# Patient Record
Sex: FEMALE | Race: WHITE | NOT HISPANIC OR LATINO
[De-identification: names, ages, dates, MRNs, and addresses within clinical notes are randomized per-mention and may not be internally consistent; named-entity substitution may affect disease eponyms.]

---

## 2017-01-24 PROCEDURE — 88305 TISSUE EXAM BY PATHOLOGIST: CPT | Performed by: RADIOLOGY

## 2017-03-14 ENCOUNTER — CHARTING TRANS (OUTPATIENT)
Dept: OTHER | Age: 42
End: 2017-03-14

## 2017-03-21 ENCOUNTER — CHARTING TRANS (OUTPATIENT)
Dept: OTHER | Age: 42
End: 2017-03-21

## 2017-07-17 PROCEDURE — 88305 TISSUE EXAM BY PATHOLOGIST: CPT | Performed by: OBSTETRICS & GYNECOLOGY

## 2017-09-13 PROCEDURE — 88305 TISSUE EXAM BY PATHOLOGIST: CPT | Performed by: RADIOLOGY

## 2017-09-22 ENCOUNTER — CHARTING TRANS (OUTPATIENT)
Dept: OTHER | Age: 42
End: 2017-09-22

## 2017-10-04 ENCOUNTER — CHARTING TRANS (OUTPATIENT)
Dept: OTHER | Age: 42
End: 2017-10-04

## 2018-01-12 PROBLEM — F32.81 PMDD (PREMENSTRUAL DYSPHORIC DISORDER): Status: ACTIVE | Noted: 2018-01-12

## 2018-08-05 ENCOUNTER — HOSPITAL ENCOUNTER (OUTPATIENT)
Age: 43
Discharge: HOME | End: 2018-08-05
Attending: EMERGENCY MEDICINE
Payer: COMMERCIAL

## 2018-08-05 VITALS
DIASTOLIC BLOOD PRESSURE: 103 MMHG | HEART RATE: 80 BPM | RESPIRATION RATE: 16 BRPM | TEMPERATURE: 98 F | SYSTOLIC BLOOD PRESSURE: 146 MMHG | WEIGHT: 215 LBS | OXYGEN SATURATION: 98 % | BODY MASS INDEX: 34 KG/M2

## 2018-08-05 DIAGNOSIS — J40 BRONCHITIS: ICD-10-CM

## 2018-08-05 DIAGNOSIS — H66.001 ACUTE SUPPURATIVE OTITIS MEDIA OF RIGHT EAR WITHOUT SPONTANEOUS RUPTURE OF TYMPANIC MEMBRANE, RECURRENCE NOT SPECIFIED: Primary | ICD-10-CM

## 2018-08-05 PROCEDURE — 99204 OFFICE O/P NEW MOD 45 MIN: CPT

## 2018-08-05 PROCEDURE — 99213 OFFICE O/P EST LOW 20 MIN: CPT

## 2018-08-05 RX ORDER — BENZONATATE 200 MG/1
200 CAPSULE ORAL 3 TIMES DAILY PRN
Qty: 30 CAPSULE | Refills: 0 | Status: SHIPPED | OUTPATIENT
Start: 2018-08-05 | End: 2018-09-04

## 2018-08-05 RX ORDER — ALBUTEROL SULFATE 90 UG/1
2 AEROSOL, METERED RESPIRATORY (INHALATION) EVERY 4 HOURS PRN
Qty: 1 INHALER | Refills: 0 | Status: SHIPPED | OUTPATIENT
Start: 2018-08-05 | End: 2018-09-04

## 2018-08-05 RX ORDER — METHYLPREDNISOLONE 4 MG/1
TABLET ORAL
Qty: 1 PACKAGE | Refills: 0 | Status: SHIPPED | OUTPATIENT
Start: 2018-08-05 | End: 2018-11-01 | Stop reason: ALTCHOICE

## 2018-08-05 RX ORDER — AMOXICILLIN AND CLAVULANATE POTASSIUM 875; 125 MG/1; MG/1
1 TABLET, FILM COATED ORAL 2 TIMES DAILY
Qty: 20 TABLET | Refills: 0 | Status: SHIPPED | OUTPATIENT
Start: 2018-08-05 | End: 2018-08-15

## 2018-08-05 NOTE — ED PROVIDER NOTES
Patient presents with:  Cough/URI  Fever (infectious)    HPI:     Jerry Joe is a 38year old female who presents with chief complaint of cough, congestion, ear pressure.   Started about 7-8 days ago with mild congestion which she attributed to [employer] bacterial.  Given ear findings will rx augmentin. Will add medrol dose pack, albuterol and tessalon to aid with cough. F/u with PCP in 3-5 days as needed, sooner, if symptoms worsen. Pt chose to forgo CXR.       Assessment/Plan:     Diagnosis:  Otitis me

## 2018-11-01 ENCOUNTER — OFFICE VISIT (OUTPATIENT)
Dept: FAMILY MEDICINE CLINIC | Facility: CLINIC | Age: 43
End: 2018-11-01
Payer: COMMERCIAL

## 2018-11-01 VITALS
BODY MASS INDEX: 33.74 KG/M2 | HEART RATE: 75 BPM | OXYGEN SATURATION: 98 % | TEMPERATURE: 99 F | SYSTOLIC BLOOD PRESSURE: 128 MMHG | HEIGHT: 67 IN | DIASTOLIC BLOOD PRESSURE: 82 MMHG | WEIGHT: 215 LBS | RESPIRATION RATE: 16 BRPM

## 2018-11-01 DIAGNOSIS — J01.01 ACUTE RECURRENT MAXILLARY SINUSITIS: Primary | ICD-10-CM

## 2018-11-01 DIAGNOSIS — H69.83 EUSTACHIAN TUBE DYSFUNCTION, BILATERAL: ICD-10-CM

## 2018-11-01 PROCEDURE — 99202 OFFICE O/P NEW SF 15 MIN: CPT | Performed by: NURSE PRACTITIONER

## 2018-11-01 RX ORDER — METHYLPREDNISOLONE 4 MG/1
TABLET ORAL
Qty: 1 KIT | Refills: 0 | Status: SHIPPED | OUTPATIENT
Start: 2018-11-01 | End: 2018-11-16 | Stop reason: ALTCHOICE

## 2018-11-01 RX ORDER — AMOXICILLIN AND CLAVULANATE POTASSIUM 875; 125 MG/1; MG/1
TABLET, FILM COATED ORAL
Qty: 20 TABLET | Refills: 0 | Status: SHIPPED | OUTPATIENT
Start: 2018-11-01 | End: 2018-11-16 | Stop reason: ALTCHOICE

## 2018-11-01 NOTE — PROGRESS NOTES
CHIEF COMPLAINT:   Patient presents with:  Sinus Problem: congestion,sinus pressure,earache sx x 2 months. HPI:   Nikko You is a 38year old female who presents for sinus symptoms for  2 months.   Reports she has allergies and they've essentiall Tobacco Use      Smoking status: Never Smoker      Smokeless tobacco: Never Used    Alcohol use:  Yes      Alcohol/week: 0.0 oz      Comment: 2-3 times a week    Drug use: No        REVIEW OF SYSTEMS:   GENERAL: feels well otherwise, normal appetite  SKIN - Advised F/U visit if no improvement/worsening within 5-7 days; sooner if new fever or worsening breathing. To ER if ever dyspnea, chest pain, SOB, severe HA/neuro change. - Pt verbalizes understanding and is agreeable w/ plan.     Meds & Refills for Deysi Mayo · A polyp is a sac of swollen tissue. It can be the result of an allergy or infection. It may block the opening where most of your sinuses drain (middle meatus). It may even grow large enough to block your nose.   · A deviated septum is when the thin wall i · Heat may help soothe painful areas of your face. Use a towel soaked in hot water. Or,  the shower and direct the warm spray onto your face.  Using a vaporizer along with a menthol rub at night may also help soothe symptoms.   · An expectorant with · Swelling of your forehead or eyelids  · Vision problems, such as blurred or double vision  · Fever of 100.4ºF (38ºC) or higher, or as directed by your healthcare provider  · Seizure  · Breathing problems  · Symptoms don't go away in 10 days  Prevention

## 2018-11-01 NOTE — PATIENT INSTRUCTIONS
Causes of Sinusitis  Mucus helps keep your sinuses clean. But mucus may build up in the sinuses because of colds, allergies, or blockages. These things get in the way of the natural drainage of mucus. This may lead to sinusitis.  Sinusitis means sinus inf © 3834-2746 [employer] [address]. [facility], [facility] [place]. All rights reserved. This information is not intended as a substitute for professional medical care. Always follow your healthcare professional's instructions.         Sinusit · You can use an over-the-counter decongestant, unless a similar medicine was prescribed to you. Nasal sprays work the fastest. Use one that contains phenylephrine or oxymetazoline. First blow your nose gently. Then use the spray.  Do not use these medicine · Don’t have close contact with people who have sore throats, colds, or other upper respiratory infections. · Don’t smoke, and stay away from secondhand smoke. · Stay up to date with of your vaccines.   Date Last Reviewed: 11/1/2017  © 9146-5992 [employer]

## 2018-11-16 PROCEDURE — 82784 ASSAY IGA/IGD/IGG/IGM EACH: CPT | Performed by: ALLERGY & IMMUNOLOGY

## 2018-11-16 PROCEDURE — 86774 TETANUS ANTIBODY: CPT | Performed by: ALLERGY & IMMUNOLOGY

## 2018-11-16 PROCEDURE — 86648 DIPHTHERIA ANTIBODY: CPT | Performed by: ALLERGY & IMMUNOLOGY

## 2018-11-16 PROCEDURE — 86317 IMMUNOASSAY INFECTIOUS AGENT: CPT | Performed by: ALLERGY & IMMUNOLOGY

## 2018-11-29 PROBLEM — J32.0 CHRONIC MAXILLARY SINUSITIS: Status: ACTIVE | Noted: 2018-11-29

## 2018-11-29 PROBLEM — G44.221 CHRONIC TENSION-TYPE HEADACHE, INTRACTABLE: Status: ACTIVE | Noted: 2018-11-29

## 2019-07-21 ENCOUNTER — WALK IN (OUTPATIENT)
Dept: URGENT CARE | Age: 44
End: 2019-07-21

## 2019-07-21 VITALS
TEMPERATURE: 99 F | HEIGHT: 67 IN | OXYGEN SATURATION: 99 % | HEART RATE: 74 BPM | SYSTOLIC BLOOD PRESSURE: 144 MMHG | BODY MASS INDEX: 31.08 KG/M2 | DIASTOLIC BLOOD PRESSURE: 96 MMHG | WEIGHT: 198 LBS | RESPIRATION RATE: 16 BRPM

## 2019-07-21 DIAGNOSIS — J06.9 ACUTE URI: Primary | ICD-10-CM

## 2019-07-21 PROCEDURE — 99203 OFFICE O/P NEW LOW 30 MIN: CPT | Performed by: NURSE PRACTITIONER

## 2019-07-21 RX ORDER — LEVONORGESTREL AND ETHINYL ESTRADIOL 100-20(84)
1 KIT ORAL
COMMUNITY
Start: 2019-02-07 | End: 2020-02-07

## 2019-07-21 RX ORDER — FLUOXETINE HYDROCHLORIDE 20 MG/1
1 CAPSULE ORAL
COMMUNITY
Start: 2016-02-23 | End: 2020-05-17

## 2019-07-21 RX ORDER — BENZONATATE 200 MG/1
200 CAPSULE ORAL 3 TIMES DAILY PRN
Qty: 30 CAPSULE | Refills: 0 | Status: SHIPPED | OUTPATIENT
Start: 2019-07-21

## 2019-07-21 ASSESSMENT — ENCOUNTER SYMPTOMS
COUGH: 1
FATIGUE: 1
SORE THROAT: 0
SINUS PAIN: 0
WHEEZING: 0
FEVER: 0
DIAPHORESIS: 0
SHORTNESS OF BREATH: 0
CHILLS: 0

## 2019-07-24 ENCOUNTER — MED REC SCAN ONLY (OUTPATIENT)
Dept: FAMILY MEDICINE CLINIC | Facility: CLINIC | Age: 44
End: 2019-07-24

## 2019-07-24 ENCOUNTER — OFFICE VISIT (OUTPATIENT)
Dept: FAMILY MEDICINE CLINIC | Facility: CLINIC | Age: 44
End: 2019-07-24
Payer: COMMERCIAL

## 2019-07-24 VITALS
WEIGHT: 200 LBS | HEART RATE: 88 BPM | SYSTOLIC BLOOD PRESSURE: 144 MMHG | BODY MASS INDEX: 31.39 KG/M2 | TEMPERATURE: 99 F | HEIGHT: 67 IN | DIASTOLIC BLOOD PRESSURE: 92 MMHG | RESPIRATION RATE: 20 BRPM

## 2019-07-24 DIAGNOSIS — I10 ESSENTIAL HYPERTENSION: Primary | ICD-10-CM

## 2019-07-24 DIAGNOSIS — Z00.00 LABORATORY EXAMINATION ORDERED AS PART OF A ROUTINE GENERAL MEDICAL EXAMINATION: ICD-10-CM

## 2019-07-24 DIAGNOSIS — J06.9 UPPER RESPIRATORY TRACT INFECTION, UNSPECIFIED TYPE: ICD-10-CM

## 2019-07-24 DIAGNOSIS — R07.89 CHEST DISCOMFORT: ICD-10-CM

## 2019-07-24 DIAGNOSIS — E78.00 ELEVATED LDL CHOLESTEROL LEVEL: ICD-10-CM

## 2019-07-24 PROCEDURE — 99204 OFFICE O/P NEW MOD 45 MIN: CPT | Performed by: NURSE PRACTITIONER

## 2019-07-24 PROCEDURE — 93000 ELECTROCARDIOGRAM COMPLETE: CPT | Performed by: NURSE PRACTITIONER

## 2019-07-24 RX ORDER — AZITHROMYCIN 250 MG/1
TABLET, FILM COATED ORAL
Qty: 6 TABLET | Refills: 0 | Status: SHIPPED | OUTPATIENT
Start: 2019-07-24 | End: 2019-10-18 | Stop reason: ALTCHOICE

## 2019-07-24 RX ORDER — BENZONATATE 100 MG/1
100 CAPSULE ORAL 3 TIMES DAILY PRN
Qty: 30 CAPSULE | Refills: 0 | Status: SHIPPED | OUTPATIENT
Start: 2019-07-24 | End: 2019-10-18 | Stop reason: ALTCHOICE

## 2019-07-24 RX ORDER — LISINOPRIL 10 MG/1
10 TABLET ORAL DAILY
Qty: 30 TABLET | Refills: 1 | Status: SHIPPED | OUTPATIENT
Start: 2019-07-24 | End: 2019-09-20

## 2019-07-24 NOTE — PROGRESS NOTES
Edna Mata is a 37year old female. HPI:   Patient presents today as a new patient to establish care.  Patient reports she has noted her blood pressures to be elevated starting back fall 2017- reports when she went in to have her ablation SBP was 135- fever-cristela/chilled  HEENT: denies sore throat, sinus pressure, sinus congestion or ear pain  RESPIRATORY: denies shortness of breath with exertion, reports cough  CARDIOVASCULAR: reports chest discomfort- feels related to coughing  NEURO: denies headaches Imaging & Consults:  ELECTROCARDIOGRAM, COMPLETE    Follow Up with:  No follow-up provider specified. 1. Essential hypertension  Start Lisinopril as ordered. Discussed potential side effects of medication. Low salt diet. Labs placed.   Monitor

## 2019-09-20 DIAGNOSIS — I10 ESSENTIAL HYPERTENSION: ICD-10-CM

## 2019-09-20 NOTE — TELEPHONE ENCOUNTER
Please call pt to remind to complete fasting blood work prior to medication refills. Last labs: 11/16/18 asked to repeat in July    Thank you.

## 2019-09-23 ENCOUNTER — TELEPHONE (OUTPATIENT)
Dept: FAMILY MEDICINE CLINIC | Facility: CLINIC | Age: 44
End: 2019-09-23

## 2019-09-23 RX ORDER — LISINOPRIL 10 MG/1
TABLET ORAL
Qty: 30 TABLET | Refills: 0 | Status: SHIPPED | OUTPATIENT
Start: 2019-09-23 | End: 2019-10-18

## 2019-09-23 NOTE — TELEPHONE ENCOUNTER
Medication refill sent to pharm. Please call pt to schedule appt for when she returns from her trip and completes her labs. LOV: 7/24/19 asked to return in 1 month    Thank you.

## 2019-09-23 NOTE — TELEPHONE ENCOUNTER
Pt called. She was inquiring about the status of the refill on the Lisinopril. She is aware that she needs to complete labs, however pt can't complete them right now. She's going out of town on 9/26 and wont' be back until 10/5/19.   She will complete th

## 2019-10-11 ENCOUNTER — LAB ENCOUNTER (OUTPATIENT)
Dept: LAB | Age: 44
End: 2019-10-11
Attending: FAMILY MEDICINE
Payer: COMMERCIAL

## 2019-10-11 DIAGNOSIS — E78.00 ELEVATED LDL CHOLESTEROL LEVEL: ICD-10-CM

## 2019-10-11 DIAGNOSIS — I10 ESSENTIAL HYPERTENSION: ICD-10-CM

## 2019-10-11 DIAGNOSIS — Z00.00 LABORATORY EXAMINATION ORDERED AS PART OF A ROUTINE GENERAL MEDICAL EXAMINATION: ICD-10-CM

## 2019-10-11 PROCEDURE — 84439 ASSAY OF FREE THYROXINE: CPT | Performed by: NURSE PRACTITIONER

## 2019-10-11 PROCEDURE — 80061 LIPID PANEL: CPT | Performed by: NURSE PRACTITIONER

## 2019-10-11 PROCEDURE — 80050 GENERAL HEALTH PANEL: CPT | Performed by: NURSE PRACTITIONER

## 2019-10-11 PROCEDURE — 36415 COLL VENOUS BLD VENIPUNCTURE: CPT | Performed by: NURSE PRACTITIONER

## 2019-10-18 ENCOUNTER — MED REC SCAN ONLY (OUTPATIENT)
Dept: FAMILY MEDICINE CLINIC | Facility: CLINIC | Age: 44
End: 2019-10-18

## 2019-10-18 ENCOUNTER — OFFICE VISIT (OUTPATIENT)
Dept: FAMILY MEDICINE CLINIC | Facility: CLINIC | Age: 44
End: 2019-10-18
Payer: COMMERCIAL

## 2019-10-18 VITALS
WEIGHT: 198 LBS | SYSTOLIC BLOOD PRESSURE: 120 MMHG | BODY MASS INDEX: 31.08 KG/M2 | TEMPERATURE: 99 F | DIASTOLIC BLOOD PRESSURE: 84 MMHG | HEIGHT: 67 IN | HEART RATE: 68 BPM | RESPIRATION RATE: 12 BRPM

## 2019-10-18 DIAGNOSIS — E78.00 HYPERCHOLESTEREMIA: ICD-10-CM

## 2019-10-18 DIAGNOSIS — Z23 NEED FOR VACCINATION: ICD-10-CM

## 2019-10-18 DIAGNOSIS — Z00.00 ROUTINE GENERAL MEDICAL EXAMINATION AT A HEALTH CARE FACILITY: Primary | ICD-10-CM

## 2019-10-18 PROBLEM — I10 BENIGN ESSENTIAL HYPERTENSION: Status: ACTIVE | Noted: 2019-10-18

## 2019-10-18 PROCEDURE — 90471 IMMUNIZATION ADMIN: CPT | Performed by: FAMILY MEDICINE

## 2019-10-18 PROCEDURE — 90686 IIV4 VACC NO PRSV 0.5 ML IM: CPT | Performed by: FAMILY MEDICINE

## 2019-10-18 PROCEDURE — 99396 PREV VISIT EST AGE 40-64: CPT | Performed by: FAMILY MEDICINE

## 2019-10-18 RX ORDER — VALACYCLOVIR HYDROCHLORIDE 1 G/1
TABLET, FILM COATED ORAL
Qty: 12 TABLET | Refills: 0 | Status: SHIPPED | OUTPATIENT
Start: 2019-10-18 | End: 2021-06-17 | Stop reason: ALTCHOICE

## 2019-10-18 RX ORDER — VALSARTAN 80 MG/1
80 TABLET ORAL DAILY
Qty: 90 TABLET | Refills: 1 | Status: SHIPPED | OUTPATIENT
Start: 2019-10-18 | End: 2020-02-06

## 2019-10-18 RX ORDER — ROSUVASTATIN CALCIUM 10 MG/1
10 TABLET, COATED ORAL NIGHTLY
Qty: 90 TABLET | Refills: 1 | Status: SHIPPED | OUTPATIENT
Start: 2019-10-18 | End: 2020-02-27

## 2019-10-18 RX ORDER — VALACYCLOVIR HYDROCHLORIDE 1 G/1
TABLET, FILM COATED ORAL
COMMUNITY
Start: 2017-03-21 | End: 2019-10-18

## 2019-10-18 NOTE — PROGRESS NOTES
CHIEF COMPLAINT   Complete physical  HPI:   Michelle Saini is a 37year old female who presents for a complete physical exam.   Very constipated since starting lisinopril. Doing Weight Watchers. On OCP for dysmenorrhea. Strong family hx CAD.     Wt Re 2017, 2017    fibrocystic changes   •       x2   • ORAL SURGERY PROCEDURE      wisdom teeth   • TONSILLECTOMY        Family History   Problem Relation Age of Onset   • Breast Cancer Paternal Grandmother 61        63   • Diabetes Mother    • intact,motor and sensory are grossly intact    ASSESSMENT AND PLAN:   Filemon Cooper is a 37year old female who presents for a complete physical exam.  Pap and pelvic deferred to gyne per patient request. Reviewed diet and exercise.    Pt' s weight is Bod

## 2019-10-27 DIAGNOSIS — I10 ESSENTIAL HYPERTENSION: ICD-10-CM

## 2019-10-28 RX ORDER — LISINOPRIL 10 MG/1
TABLET ORAL
Qty: 30 TABLET | Refills: 0 | OUTPATIENT
Start: 2019-10-28

## 2019-11-22 DIAGNOSIS — I10 ESSENTIAL HYPERTENSION: ICD-10-CM

## 2019-11-22 RX ORDER — LISINOPRIL 10 MG/1
TABLET ORAL
Qty: 30 TABLET | Refills: 0 | OUTPATIENT
Start: 2019-11-22

## 2020-02-04 ENCOUNTER — TELEPHONE (OUTPATIENT)
Dept: FAMILY MEDICINE CLINIC | Facility: CLINIC | Age: 45
End: 2020-02-04

## 2020-02-04 NOTE — TELEPHONE ENCOUNTER
Fine tremors bilat hand  Since Oct 2019   Since thinks since she started valsartan   Numbness nor tingling at night time, reported had this for years)  No weakness   No chest pain   No h/a   No dizziness  No speech prob   No SOB    Booked for appt Thurs

## 2020-02-04 NOTE — TELEPHONE ENCOUNTER
Pt has had tremors since starting bp meds in October. She would like to talk to a nurse. please advise. thank you.

## 2020-02-06 ENCOUNTER — OFFICE VISIT (OUTPATIENT)
Dept: FAMILY MEDICINE CLINIC | Facility: CLINIC | Age: 45
End: 2020-02-06
Payer: COMMERCIAL

## 2020-02-06 VITALS
SYSTOLIC BLOOD PRESSURE: 160 MMHG | WEIGHT: 212 LBS | DIASTOLIC BLOOD PRESSURE: 100 MMHG | TEMPERATURE: 97 F | HEIGHT: 67 IN | OXYGEN SATURATION: 98 % | BODY MASS INDEX: 33.27 KG/M2 | HEART RATE: 75 BPM | RESPIRATION RATE: 16 BRPM

## 2020-02-06 DIAGNOSIS — I10 BENIGN ESSENTIAL HYPERTENSION: Primary | ICD-10-CM

## 2020-02-06 DIAGNOSIS — G25.2 KINETIC TREMOR: ICD-10-CM

## 2020-02-06 PROCEDURE — 99214 OFFICE O/P EST MOD 30 MIN: CPT | Performed by: FAMILY MEDICINE

## 2020-02-06 RX ORDER — METOPROLOL SUCCINATE 50 MG/1
50 TABLET, EXTENDED RELEASE ORAL DAILY
Qty: 30 TABLET | Refills: 1 | Status: SHIPPED | OUTPATIENT
Start: 2020-02-06 | End: 2020-03-06

## 2020-02-06 RX ORDER — FLUOXETINE HYDROCHLORIDE 20 MG/1
CAPSULE ORAL
COMMUNITY
Start: 2020-01-16 | End: 2020-02-06

## 2020-02-06 NOTE — PATIENT INSTRUCTIONS
Stop valsartan. Start metoprolol. Follow up with me in 1 month. Check [facility] for wrist splint for carpal tunnel - wear at night while sleeping.

## 2020-02-06 NOTE — PROGRESS NOTES
Constance Sales is a 53year old female. CHIEF COMPLAINT   Follow up on HTN and also hand tremor. HPI:   Dental hygienist.  Noticing hand tremors since starting valsartan. Notices when using hands. No resting tremor.   Has noted just since starting va suspicious lesions  NECK: supple,no adenopathy,no bruits  LUNGS: clear to auscultation  CARDIO: RRR without murmur  EXTREMITIES: no cyanosis, clubbing or edema. Tinel's negative. Phalen's negative.    NEURO: no focal deficits    ASSESSMENT AND PLAN:     Neno Wheat

## 2020-02-27 ENCOUNTER — TELEPHONE (OUTPATIENT)
Dept: FAMILY MEDICINE CLINIC | Facility: CLINIC | Age: 45
End: 2020-02-27

## 2020-02-27 ENCOUNTER — APPOINTMENT (OUTPATIENT)
Dept: LAB | Age: 45
End: 2020-02-27
Attending: FAMILY MEDICINE
Payer: COMMERCIAL

## 2020-02-27 DIAGNOSIS — E78.00 HYPERCHOLESTEREMIA: ICD-10-CM

## 2020-02-27 DIAGNOSIS — E78.00 HYPERCHOLESTEREMIA: Primary | ICD-10-CM

## 2020-02-27 LAB
ALT SERPL-CCNC: 44 U/L (ref 13–56)
AST SERPL-CCNC: 26 U/L (ref 15–37)
CHOLEST SMN-MCNC: 202 MG/DL (ref ?–200)
HDLC SERPL-MCNC: 51 MG/DL (ref 40–59)
LDLC SERPL CALC-MCNC: 120 MG/DL (ref ?–100)
NONHDLC SERPL-MCNC: 151 MG/DL (ref ?–130)
PATIENT FASTING Y/N/NP: YES
TRIGL SERPL-MCNC: 157 MG/DL (ref 30–149)
VLDLC SERPL CALC-MCNC: 31 MG/DL (ref 0–30)

## 2020-02-27 PROCEDURE — 80061 LIPID PANEL: CPT | Performed by: FAMILY MEDICINE

## 2020-02-27 PROCEDURE — 36415 COLL VENOUS BLD VENIPUNCTURE: CPT | Performed by: FAMILY MEDICINE

## 2020-02-27 PROCEDURE — 84450 TRANSFERASE (AST) (SGOT): CPT | Performed by: FAMILY MEDICINE

## 2020-02-27 PROCEDURE — 84460 ALANINE AMINO (ALT) (SGPT): CPT | Performed by: FAMILY MEDICINE

## 2020-02-27 RX ORDER — ROSUVASTATIN CALCIUM 20 MG/1
TABLET, COATED ORAL
Qty: 30 TABLET | COMMUNITY
Start: 2020-02-27 | End: 2020-03-06

## 2020-03-06 ENCOUNTER — OFFICE VISIT (OUTPATIENT)
Dept: FAMILY MEDICINE CLINIC | Facility: CLINIC | Age: 45
End: 2020-03-06
Payer: COMMERCIAL

## 2020-03-06 VITALS
HEART RATE: 64 BPM | RESPIRATION RATE: 12 BRPM | SYSTOLIC BLOOD PRESSURE: 114 MMHG | WEIGHT: 206 LBS | DIASTOLIC BLOOD PRESSURE: 80 MMHG | TEMPERATURE: 98 F | HEIGHT: 67 IN | BODY MASS INDEX: 32.33 KG/M2

## 2020-03-06 DIAGNOSIS — I10 BENIGN ESSENTIAL HYPERTENSION: Primary | ICD-10-CM

## 2020-03-06 DIAGNOSIS — E78.00 HYPERCHOLESTEREMIA: ICD-10-CM

## 2020-03-06 PROBLEM — N94.6 DYSMENORRHEA: Status: ACTIVE | Noted: 2020-03-06

## 2020-03-06 PROCEDURE — 99214 OFFICE O/P EST MOD 30 MIN: CPT | Performed by: FAMILY MEDICINE

## 2020-03-06 RX ORDER — METOPROLOL SUCCINATE 50 MG/1
50 TABLET, EXTENDED RELEASE ORAL DAILY
Qty: 30 TABLET | Refills: 5 | Status: SHIPPED | OUTPATIENT
Start: 2020-03-06 | End: 2020-10-01

## 2020-03-06 RX ORDER — ROSUVASTATIN CALCIUM 20 MG/1
TABLET, COATED ORAL
Qty: 30 TABLET | Refills: 2 | Status: SHIPPED | OUTPATIENT
Start: 2020-03-06 | End: 2020-06-17

## 2020-03-06 RX ORDER — ROSUVASTATIN CALCIUM 20 MG/1
TABLET, COATED ORAL
Qty: 30 TABLET | Refills: 5 | Status: CANCELLED | OUTPATIENT
Start: 2020-03-06

## 2020-03-06 NOTE — PROGRESS NOTES
CHIEF COMPLAINT:   Pasquale Voss a 53year old female is here for followup on HTN  HPI:   Pasquale Voss has been doing well since the last visit here. Pasquale Voss  is compliant with hypertensive medication. No chest pain. No dyspnea.  No palpitati blood pressures noted while monitoring at home. Will follow up with results of blood work to be done 8 weeks after start of new crestor dose.

## 2020-04-27 ENCOUNTER — TELEPHONE (OUTPATIENT)
Dept: FAMILY MEDICINE CLINIC | Facility: CLINIC | Age: 45
End: 2020-04-27

## 2020-04-27 NOTE — TELEPHONE ENCOUNTER
MARCO ANTONIO. to pt. I informed her S. Holland Farley would like her to go to the [facility] for eval. Yfn Jackson recommended having someone drive her. She was informed she will probably need stat labs and a chest xray. I gave pt the address of the [facility].  Pt agreed to plan and v

## 2020-04-27 NOTE — TELEPHONE ENCOUNTER
Pt returned call, stating she had chest pain \"3 to 4\" all day yesterday, mid chest to left side, denies fever, nausea,vomiting,numbness,tingling,SOB, or heart racing.  Denies heartburn,\"I have had that before and it does not feel like that\" did not take

## 2020-04-27 NOTE — TELEPHONE ENCOUNTER
Paged MD on call stating chest pains yesterday and returned today paged at 8:35am, lm on cell# no answer at home #

## 2020-04-27 NOTE — TELEPHONE ENCOUNTER
Recommend urgent care evaluation today. Will likely need stat labs and CXR which we can't do here. She should have someone drive her to [address] [facility].

## 2020-06-17 DIAGNOSIS — E78.00 HYPERCHOLESTEREMIA: ICD-10-CM

## 2020-06-17 RX ORDER — ROSUVASTATIN CALCIUM 20 MG/1
TABLET, COATED ORAL
Qty: 30 TABLET | Refills: 1 | Status: SHIPPED | OUTPATIENT
Start: 2020-06-17 | End: 2020-09-01

## 2020-06-17 NOTE — TELEPHONE ENCOUNTER
LOV  3-6-20  Crestor started and was to repeat labs in 8 weeks. Not done.     Please advise on refills

## 2020-08-28 ENCOUNTER — LAB ENCOUNTER (OUTPATIENT)
Dept: LAB | Age: 45
End: 2020-08-28
Attending: FAMILY MEDICINE
Payer: COMMERCIAL

## 2020-08-28 DIAGNOSIS — E78.00 HYPERCHOLESTEREMIA: ICD-10-CM

## 2020-08-28 LAB
ALBUMIN SERPL-MCNC: 4 G/DL (ref 3.4–5)
ALP LIVER SERPL-CCNC: 54 U/L (ref 39–100)
ALT SERPL-CCNC: 38 U/L (ref 13–56)
AST SERPL-CCNC: 26 U/L (ref 15–37)
BILIRUB DIRECT SERPL-MCNC: 0.2 MG/DL (ref 0–0.2)
BILIRUB SERPL-MCNC: 0.8 MG/DL (ref 0.1–2)
CHOLEST SMN-MCNC: 200 MG/DL (ref ?–200)
HDLC SERPL-MCNC: 68 MG/DL (ref 40–59)
LDLC SERPL CALC-MCNC: 108 MG/DL (ref ?–100)
M PROTEIN MFR SERPL ELPH: 7.8 G/DL (ref 6.4–8.2)
NONHDLC SERPL-MCNC: 132 MG/DL (ref ?–130)
PATIENT FASTING Y/N/NP: YES
TRIGL SERPL-MCNC: 119 MG/DL (ref 30–149)
VLDLC SERPL CALC-MCNC: 24 MG/DL (ref 0–30)

## 2020-08-28 PROCEDURE — 36415 COLL VENOUS BLD VENIPUNCTURE: CPT

## 2020-08-28 PROCEDURE — 80076 HEPATIC FUNCTION PANEL: CPT

## 2020-08-28 PROCEDURE — 80061 LIPID PANEL: CPT

## 2020-08-31 DIAGNOSIS — E78.00 HYPERCHOLESTEREMIA: ICD-10-CM

## 2020-09-01 RX ORDER — ROSUVASTATIN CALCIUM 20 MG/1
TABLET, COATED ORAL
Qty: 30 TABLET | Refills: 0 | Status: SHIPPED | OUTPATIENT
Start: 2020-09-01 | End: 2020-10-01

## 2020-09-29 DIAGNOSIS — I10 BENIGN ESSENTIAL HYPERTENSION: Primary | ICD-10-CM

## 2020-09-29 DIAGNOSIS — E78.00 HYPERCHOLESTEREMIA: ICD-10-CM

## 2020-10-01 RX ORDER — METOPROLOL SUCCINATE 50 MG/1
TABLET, EXTENDED RELEASE ORAL
Qty: 30 TABLET | Refills: 5 | Status: SHIPPED | OUTPATIENT
Start: 2020-10-01 | End: 2020-10-02 | Stop reason: CLARIF

## 2020-10-01 RX ORDER — ROSUVASTATIN CALCIUM 20 MG/1
TABLET, COATED ORAL
Qty: 30 TABLET | Refills: 0 | Status: SHIPPED | OUTPATIENT
Start: 2020-10-01 | End: 2020-10-29

## 2020-10-02 RX ORDER — METOPROLOL SUCCINATE 50 MG/1
50 TABLET, EXTENDED RELEASE ORAL DAILY
Qty: 30 TABLET | Refills: 0 | COMMUNITY
Start: 2020-10-02 | End: 2020-10-29

## 2020-10-02 NOTE — TELEPHONE ENCOUNTER
Originally sent as #[phone]. Per NICHELLE Diaz there should be no refills. Called to [facility] to change.

## 2020-10-22 PROBLEM — Z91.89 AT HIGH RISK FOR BREAST CANCER: Status: ACTIVE | Noted: 2020-10-22

## 2020-10-29 ENCOUNTER — OFFICE VISIT (OUTPATIENT)
Dept: FAMILY MEDICINE CLINIC | Facility: CLINIC | Age: 45
End: 2020-10-29
Payer: COMMERCIAL

## 2020-10-29 VITALS
HEART RATE: 64 BPM | DIASTOLIC BLOOD PRESSURE: 80 MMHG | TEMPERATURE: 97 F | BODY MASS INDEX: 34.37 KG/M2 | SYSTOLIC BLOOD PRESSURE: 130 MMHG | WEIGHT: 219 LBS | HEIGHT: 67 IN | RESPIRATION RATE: 12 BRPM

## 2020-10-29 DIAGNOSIS — Z12.11 SCREENING FOR COLORECTAL CANCER: ICD-10-CM

## 2020-10-29 DIAGNOSIS — I10 BENIGN ESSENTIAL HYPERTENSION: ICD-10-CM

## 2020-10-29 DIAGNOSIS — E78.00 HYPERCHOLESTEREMIA: ICD-10-CM

## 2020-10-29 DIAGNOSIS — Z12.12 SCREENING FOR COLORECTAL CANCER: ICD-10-CM

## 2020-10-29 DIAGNOSIS — Z00.00 ROUTINE GENERAL MEDICAL EXAMINATION AT A HEALTH CARE FACILITY: Primary | ICD-10-CM

## 2020-10-29 DIAGNOSIS — Z13.89 SCREENING FOR GENITOURINARY CONDITION: ICD-10-CM

## 2020-10-29 DIAGNOSIS — Z00.00 BLOOD TESTS FOR ROUTINE GENERAL PHYSICAL EXAMINATION: ICD-10-CM

## 2020-10-29 PROCEDURE — 99396 PREV VISIT EST AGE 40-64: CPT | Performed by: FAMILY MEDICINE

## 2020-10-29 PROCEDURE — 3079F DIAST BP 80-89 MM HG: CPT | Performed by: FAMILY MEDICINE

## 2020-10-29 PROCEDURE — 3008F BODY MASS INDEX DOCD: CPT | Performed by: FAMILY MEDICINE

## 2020-10-29 PROCEDURE — 3075F SYST BP GE 130 - 139MM HG: CPT | Performed by: FAMILY MEDICINE

## 2020-10-29 RX ORDER — ROSUVASTATIN CALCIUM 20 MG/1
20 TABLET, COATED ORAL NIGHTLY
Qty: 90 TABLET | Refills: 1 | Status: SHIPPED | OUTPATIENT
Start: 2020-10-29 | End: 2021-04-29

## 2020-10-29 RX ORDER — FLUOXETINE HYDROCHLORIDE 40 MG/1
40 CAPSULE ORAL DAILY
Qty: 90 CAPSULE | Refills: 1 | Status: SHIPPED | OUTPATIENT
Start: 2020-10-29 | End: 2021-05-13 | Stop reason: DRUGHIGH

## 2020-10-29 RX ORDER — FLUOXETINE HYDROCHLORIDE 20 MG/1
20 CAPSULE ORAL DAILY
COMMUNITY
Start: 2020-07-06 | End: 2020-10-29 | Stop reason: DRUGHIGH

## 2020-10-29 RX ORDER — METOPROLOL SUCCINATE 50 MG/1
50 TABLET, EXTENDED RELEASE ORAL DAILY
Qty: 90 TABLET | Refills: 1 | Status: SHIPPED | OUTPATIENT
Start: 2020-10-29 | End: 2021-04-29

## 2020-10-29 NOTE — PROGRESS NOTES
CHIEF COMPLAINT   Complete physical  HPI:   Neo Restrepo is a 53year old female who presents for a complete physical exam.   Sees gyne - Dr. Awa Morales.   Sees a breast specialist out of [place] - has had two biopsies in the past.  Alternating mammogram and M MCG/ACT Inhalation Aero Soln Inhale 2 puffs into the lungs every 6 (six) hours as needed for Wheezing.           Hep B [Hepatitis B *      Lisinopril              OTHER (SEE COMMENTS)    Comment:constipation   Past Medical History:   Diagnosis Date   • HEAD masses. BREAST:DEFERRED TO GYNE  LUNGS: clear to auscultation; no rhonchi, rales, or wheezing  CARDIO: RRR without murmur  GI: good BS's,no masses, organomegaly or tenderness  :DEFERRED TO GYNE   MUSCULOSKELETAL: No obvious joint deformity or swelling.

## 2021-01-08 ENCOUNTER — TELEPHONE (OUTPATIENT)
Dept: FAMILY MEDICINE CLINIC | Facility: CLINIC | Age: 46
End: 2021-01-08

## 2021-01-08 ENCOUNTER — TELEMEDICINE (OUTPATIENT)
Dept: FAMILY MEDICINE CLINIC | Facility: CLINIC | Age: 46
End: 2021-01-08
Payer: COMMERCIAL

## 2021-01-08 DIAGNOSIS — T88.1XXA LOCAL REACTION TO IMMUNIZATION, INITIAL ENCOUNTER: Primary | ICD-10-CM

## 2021-01-08 PROCEDURE — 99213 OFFICE O/P EST LOW 20 MIN: CPT | Performed by: FAMILY MEDICINE

## 2021-01-08 NOTE — TELEPHONE ENCOUNTER
Patient received Moderna vaccine 12/31 at [address] [place] [address]. Patient states she  started having a reaction on Wednesday. Says her arm is red, swollen and itchy. She's been having to keep an ice pack on it since it burns.      Live call sent to fritz

## 2021-01-08 NOTE — PROGRESS NOTES
Dot Notice is a 50year old female. CHIEF COMPLAINT   Arm swelling after Covid vaccine  HPI:   This visit is conducted using Telemedicine with live, interactive video and audio.  Patient understands and accepts financial responsibility for any deducti There were no vitals taken for this visit. GENERAL: well developed, well nourished,in no apparent distress  SKIN: Left deltoid with several centimeters of mild redness.   No urticaria noted  ENT:  No lip swelling or facial swelling  RESP:  Speaking in fu

## 2021-01-08 NOTE — TELEPHONE ENCOUNTER
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

## 2021-01-28 RX ORDER — FLUOXETINE HYDROCHLORIDE 20 MG/1
20 CAPSULE ORAL DAILY
Qty: 90 CAPSULE | Refills: 0 | Status: SHIPPED | OUTPATIENT
Start: 2021-01-28 | End: 2021-04-29

## 2021-01-28 NOTE — TELEPHONE ENCOUNTER
Patient states that Dr. Aawd Min upped her Fluoxitine to 40 mg. Then Olive Anderson took over the prescription.   Patient would like to go back to FLUoxetine HCl 20 MG Oral Cap  Sent to [id number] - [place] - [address] AT [facility]

## 2021-04-29 ENCOUNTER — TELEPHONE (OUTPATIENT)
Dept: FAMILY MEDICINE CLINIC | Facility: CLINIC | Age: 46
End: 2021-04-29

## 2021-04-29 DIAGNOSIS — E78.00 HYPERCHOLESTEREMIA: ICD-10-CM

## 2021-04-29 DIAGNOSIS — I10 BENIGN ESSENTIAL HYPERTENSION: ICD-10-CM

## 2021-04-29 RX ORDER — FLUOXETINE HYDROCHLORIDE 20 MG/1
20 CAPSULE ORAL DAILY
Qty: 90 CAPSULE | Refills: 0 | Status: SHIPPED | OUTPATIENT
Start: 2021-04-29 | End: 2021-05-13

## 2021-04-29 RX ORDER — METOPROLOL SUCCINATE 50 MG/1
50 TABLET, EXTENDED RELEASE ORAL DAILY
Qty: 90 TABLET | Refills: 1 | Status: SHIPPED | OUTPATIENT
Start: 2021-04-29 | End: 2021-11-08

## 2021-04-29 RX ORDER — ROSUVASTATIN CALCIUM 20 MG/1
20 TABLET, COATED ORAL NIGHTLY
Qty: 90 TABLET | Refills: 1 | Status: SHIPPED | OUTPATIENT
Start: 2021-04-29 | End: 2021-11-08

## 2021-04-29 NOTE — TELEPHONE ENCOUNTER
Refill requested via fax from [facility]     I called patient and verified she would like Rx's filled-->yes.     She was also advised she was to follow-up with Renetta Andrews in 6 moths from her LOV on 10/29/21--->scheduled follow-up visit on: 5/13/21 @

## 2021-05-13 ENCOUNTER — OFFICE VISIT (OUTPATIENT)
Dept: FAMILY MEDICINE CLINIC | Facility: CLINIC | Age: 46
End: 2021-05-13
Payer: COMMERCIAL

## 2021-05-13 VITALS
HEART RATE: 60 BPM | RESPIRATION RATE: 14 BRPM | HEIGHT: 67 IN | WEIGHT: 229 LBS | DIASTOLIC BLOOD PRESSURE: 80 MMHG | SYSTOLIC BLOOD PRESSURE: 112 MMHG | BODY MASS INDEX: 35.94 KG/M2 | OXYGEN SATURATION: 98 %

## 2021-05-13 DIAGNOSIS — R06.83 SNORING: ICD-10-CM

## 2021-05-13 DIAGNOSIS — I10 BENIGN ESSENTIAL HYPERTENSION: Primary | ICD-10-CM

## 2021-05-13 DIAGNOSIS — E78.00 HYPERCHOLESTEREMIA: ICD-10-CM

## 2021-05-13 DIAGNOSIS — F32.81 PMDD (PREMENSTRUAL DYSPHORIC DISORDER): ICD-10-CM

## 2021-05-13 PROCEDURE — 3074F SYST BP LT 130 MM HG: CPT | Performed by: FAMILY MEDICINE

## 2021-05-13 PROCEDURE — 3079F DIAST BP 80-89 MM HG: CPT | Performed by: FAMILY MEDICINE

## 2021-05-13 PROCEDURE — 99214 OFFICE O/P EST MOD 30 MIN: CPT | Performed by: FAMILY MEDICINE

## 2021-05-13 PROCEDURE — 3008F BODY MASS INDEX DOCD: CPT | Performed by: FAMILY MEDICINE

## 2021-05-13 RX ORDER — FLUOXETINE HYDROCHLORIDE 20 MG/1
20 CAPSULE ORAL DAILY
Qty: 90 CAPSULE | Refills: 3 | Status: SHIPPED | OUTPATIENT
Start: 2021-05-13

## 2021-05-13 NOTE — PROGRESS NOTES
CHIEF COMPLAINT:   Trang Acuña a 50year old female is here for followup on HTN  HPI:   Trang Acuña has been doing well since the last visit here. Trang Acuña  is compliant with hypertensive medication. No chest pain. No dyspnea.  No palpitati Imaging & Consults:  See Dr. Hilary Gusman to discuss surgical intervention to help with breathing through nose/snoring/deviated septum. Continue current management. Continue to monitor blood pressure. Continue regular exercise. Watch salt intake.   Shelton Lombard

## 2021-06-17 ENCOUNTER — OFFICE VISIT (OUTPATIENT)
Dept: FAMILY MEDICINE CLINIC | Facility: CLINIC | Age: 46
End: 2021-06-17
Payer: COMMERCIAL

## 2021-06-17 ENCOUNTER — LAB ENCOUNTER (OUTPATIENT)
Dept: LAB | Age: 46
End: 2021-06-17
Attending: FAMILY MEDICINE
Payer: COMMERCIAL

## 2021-06-17 VITALS
TEMPERATURE: 99 F | DIASTOLIC BLOOD PRESSURE: 90 MMHG | SYSTOLIC BLOOD PRESSURE: 138 MMHG | WEIGHT: 230 LBS | BODY MASS INDEX: 36.1 KG/M2 | HEIGHT: 67 IN | RESPIRATION RATE: 16 BRPM | HEART RATE: 64 BPM

## 2021-06-17 DIAGNOSIS — Z00.00 BLOOD TESTS FOR ROUTINE GENERAL PHYSICAL EXAMINATION: ICD-10-CM

## 2021-06-17 DIAGNOSIS — Z13.89 SCREENING FOR GENITOURINARY CONDITION: ICD-10-CM

## 2021-06-17 DIAGNOSIS — R10.11 RIGHT UPPER QUADRANT ABDOMINAL PAIN: Primary | ICD-10-CM

## 2021-06-17 PROCEDURE — 3008F BODY MASS INDEX DOCD: CPT | Performed by: FAMILY MEDICINE

## 2021-06-17 PROCEDURE — 3075F SYST BP GE 130 - 139MM HG: CPT | Performed by: FAMILY MEDICINE

## 2021-06-17 PROCEDURE — 99213 OFFICE O/P EST LOW 20 MIN: CPT | Performed by: FAMILY MEDICINE

## 2021-06-17 PROCEDURE — 3080F DIAST BP >= 90 MM HG: CPT | Performed by: FAMILY MEDICINE

## 2021-06-17 PROCEDURE — 80050 GENERAL HEALTH PANEL: CPT | Performed by: FAMILY MEDICINE

## 2021-06-17 PROCEDURE — 81003 URINALYSIS AUTO W/O SCOPE: CPT | Performed by: FAMILY MEDICINE

## 2021-06-17 PROCEDURE — 80061 LIPID PANEL: CPT | Performed by: FAMILY MEDICINE

## 2021-09-21 ENCOUNTER — MED REC SCAN ONLY (OUTPATIENT)
Dept: FAMILY MEDICINE CLINIC | Facility: CLINIC | Age: 46
End: 2021-09-21

## 2021-11-08 DIAGNOSIS — E78.00 HYPERCHOLESTEREMIA: ICD-10-CM

## 2021-11-08 DIAGNOSIS — I10 BENIGN ESSENTIAL HYPERTENSION: ICD-10-CM

## 2021-11-08 RX ORDER — METOPROLOL SUCCINATE 50 MG/1
TABLET, EXTENDED RELEASE ORAL
Qty: 90 TABLET | Refills: 0 | Status: SHIPPED | OUTPATIENT
Start: 2021-11-08

## 2021-11-08 RX ORDER — ROSUVASTATIN CALCIUM 20 MG/1
TABLET, COATED ORAL
Qty: 90 TABLET | Refills: 0 | Status: SHIPPED | OUTPATIENT
Start: 2021-11-08

## 2021-12-27 ENCOUNTER — TELEPHONE (OUTPATIENT)
Dept: FAMILY MEDICINE CLINIC | Facility: CLINIC | Age: 46
End: 2021-12-27

## 2021-12-27 NOTE — TELEPHONE ENCOUNTER
Spoke with the patient RE: her page. She stated that she was exposed on the 22nd. The 1st Covid test was neg, retested again on yesterday and was positive. She is having bilateral ear pain and runny nose with a little of nasal congestion and fatiuge.

## 2022-01-06 ENCOUNTER — ORDER TRANSCRIPTION (OUTPATIENT)
Dept: ADMINISTRATIVE | Facility: HOSPITAL | Age: 47
End: 2022-01-06

## 2022-01-06 DIAGNOSIS — Z11.59 SCREENING FOR VIRAL DISEASE: ICD-10-CM

## 2022-01-06 DIAGNOSIS — Z01.818 PRE-PROCEDURAL EXAMINATION: Primary | ICD-10-CM

## 2022-01-20 ENCOUNTER — LAB ENCOUNTER (OUTPATIENT)
Dept: LAB | Age: 47
End: 2022-01-20
Attending: OTOLARYNGOLOGY
Payer: COMMERCIAL

## 2022-01-20 ENCOUNTER — EKG ENCOUNTER (OUTPATIENT)
Dept: LAB | Age: 47
End: 2022-01-20
Attending: OTOLARYNGOLOGY
Payer: COMMERCIAL

## 2022-01-20 DIAGNOSIS — I10 BENIGN ESSENTIAL HYPERTENSION: ICD-10-CM

## 2022-01-20 LAB
ALBUMIN SERPL-MCNC: 4.1 G/DL (ref 3.4–5)
ALBUMIN/GLOB SERPL: 1.2 {RATIO} (ref 1–2)
ALP LIVER SERPL-CCNC: 62 U/L
ALT SERPL-CCNC: 45 U/L
ANION GAP SERPL CALC-SCNC: 7 MMOL/L (ref 0–18)
AST SERPL-CCNC: 24 U/L (ref 15–37)
ATRIAL RATE: 67 BPM
BILIRUB SERPL-MCNC: 0.6 MG/DL (ref 0.1–2)
BUN BLD-MCNC: 10 MG/DL (ref 7–18)
CALCIUM BLD-MCNC: 10 MG/DL (ref 8.5–10.1)
CHLORIDE SERPL-SCNC: 100 MMOL/L (ref 98–112)
CO2 SERPL-SCNC: 31 MMOL/L (ref 21–32)
CREAT BLD-MCNC: 0.87 MG/DL
FASTING STATUS PATIENT QL REPORTED: NO
GLOBULIN PLAS-MCNC: 3.4 G/DL (ref 2.8–4.4)
GLUCOSE BLD-MCNC: 127 MG/DL (ref 70–99)
OSMOLALITY SERPL CALC.SUM OF ELEC: 287 MOSM/KG (ref 275–295)
P AXIS: 43 DEGREES
P-R INTERVAL: 130 MS
POTASSIUM SERPL-SCNC: 4.2 MMOL/L (ref 3.5–5.1)
PROT SERPL-MCNC: 7.5 G/DL (ref 6.4–8.2)
Q-T INTERVAL: 410 MS
QRS DURATION: 76 MS
QTC CALCULATION (BEZET): 433 MS
R AXIS: -19 DEGREES
SODIUM SERPL-SCNC: 138 MMOL/L (ref 136–145)
T AXIS: 10 DEGREES
VENTRICULAR RATE: 67 BPM

## 2022-01-20 PROCEDURE — 36415 COLL VENOUS BLD VENIPUNCTURE: CPT

## 2022-01-20 PROCEDURE — 93010 ELECTROCARDIOGRAM REPORT: CPT | Performed by: INTERNAL MEDICINE

## 2022-01-20 PROCEDURE — 93005 ELECTROCARDIOGRAM TRACING: CPT

## 2022-01-20 PROCEDURE — 80053 COMPREHEN METABOLIC PANEL: CPT

## 2022-02-17 RX ORDER — METOPROLOL SUCCINATE 50 MG/1
TABLET, EXTENDED RELEASE ORAL
Qty: 90 TABLET | Refills: 0 | Status: SHIPPED | OUTPATIENT
Start: 2022-02-17

## 2022-02-17 RX ORDER — ROSUVASTATIN CALCIUM 20 MG/1
TABLET, COATED ORAL
Qty: 90 TABLET | Refills: 0 | Status: SHIPPED | OUTPATIENT
Start: 2022-02-17 | End: 2022-03-10

## 2022-03-03 ENCOUNTER — HOSPITAL ENCOUNTER (OUTPATIENT)
Dept: MAMMOGRAPHY | Age: 47
Discharge: HOME | End: 2022-03-03
Attending: OBSTETRICS & GYNECOLOGY
Payer: COMMERCIAL

## 2022-03-03 DIAGNOSIS — Z12.31 ENCOUNTER FOR SCREENING MAMMOGRAM FOR MALIGNANT NEOPLASM OF BREAST: ICD-10-CM

## 2022-03-03 PROCEDURE — 77063 BREAST TOMOSYNTHESIS BI: CPT | Performed by: OBSTETRICS & GYNECOLOGY

## 2022-03-03 PROCEDURE — 77067 SCR MAMMO BI INCL CAD: CPT | Performed by: OBSTETRICS & GYNECOLOGY

## 2022-03-08 ENCOUNTER — HOSPITAL ENCOUNTER (OUTPATIENT)
Age: 47
Discharge: HOME | End: 2022-03-08
Payer: COMMERCIAL

## 2022-03-08 ENCOUNTER — APPOINTMENT (OUTPATIENT)
Dept: CT IMAGING | Age: 47
End: 2022-03-08
Attending: NURSE PRACTITIONER
Payer: COMMERCIAL

## 2022-03-08 ENCOUNTER — TELEPHONE (OUTPATIENT)
Dept: FAMILY MEDICINE CLINIC | Facility: CLINIC | Age: 47
End: 2022-03-08

## 2022-03-08 VITALS
WEIGHT: 230 LBS | TEMPERATURE: 98 F | DIASTOLIC BLOOD PRESSURE: 92 MMHG | BODY MASS INDEX: 36.96 KG/M2 | HEART RATE: 63 BPM | OXYGEN SATURATION: 97 % | HEIGHT: 66 IN | RESPIRATION RATE: 18 BRPM | SYSTOLIC BLOOD PRESSURE: 157 MMHG

## 2022-03-08 DIAGNOSIS — M54.50 LUMBAR PAIN: Primary | ICD-10-CM

## 2022-03-08 LAB
#MXD IC: 0.6 X10ˆ3/UL (ref 0.1–1)
B-HCG UR QL: NEGATIVE
BUN BLD-MCNC: 13 MG/DL (ref 7–18)
CHLORIDE BLD-SCNC: 101 MMOL/L (ref 98–112)
CO2 BLD-SCNC: 27 MMOL/L (ref 21–32)
CREAT BLD-MCNC: 0.7 MG/DL
GLUCOSE BLD-MCNC: 88 MG/DL (ref 70–99)
HCT VFR BLD AUTO: 41.2 %
HCT VFR BLD CALC: 44 %
HGB BLD-MCNC: 14.3 G/DL
ISTAT IONIZED CALCIUM FOR CHEM 8: 1.26 MMOL/L (ref 1.12–1.32)
LYMPHOCYTES # BLD AUTO: 3 X10ˆ3/UL (ref 1–4)
LYMPHOCYTES NFR BLD AUTO: 35.1 %
MCH RBC QN AUTO: 31.8 PG (ref 26–34)
MCHC RBC AUTO-ENTMCNC: 34.7 G/DL (ref 31–37)
MCV RBC AUTO: 91.8 FL (ref 80–100)
MIXED CELL %: 7.3 %
NEUTROPHILS # BLD AUTO: 4.9 X10ˆ3/UL (ref 1.5–7.7)
NEUTROPHILS NFR BLD AUTO: 57.6 %
PLATELET # BLD AUTO: 349 X10ˆ3/UL (ref 150–450)
POCT BILIRUBIN URINE: NEGATIVE
POCT BLOOD URINE: NEGATIVE
POCT GLUCOSE URINE: NEGATIVE MG/DL
POCT KETONE URINE: NEGATIVE MG/DL
POCT LEUKOCYTE ESTERASE URINE: NEGATIVE
POCT NITRITE URINE: NEGATIVE
POCT PH URINE: 6 (ref 5–8)
POCT PROTEIN URINE: NEGATIVE MG/DL
POCT SPECIFIC GRAVITY URINE: 1.01
POCT URINE CLARITY: CLEAR
POCT URINE COLOR: YELLOW
POCT UROBILINOGEN URINE: 0.2 MG/DL
POTASSIUM BLD-SCNC: 3.5 MMOL/L (ref 3.6–5.1)
RBC # BLD AUTO: 4.49 X10ˆ6/UL
SODIUM BLD-SCNC: 139 MMOL/L (ref 136–145)
WBC # BLD AUTO: 8.5 X10ˆ3/UL (ref 4–11)

## 2022-03-08 PROCEDURE — 85025 COMPLETE CBC W/AUTO DIFF WBC: CPT | Performed by: NURSE PRACTITIONER

## 2022-03-08 PROCEDURE — 99213 OFFICE O/P EST LOW 20 MIN: CPT | Performed by: NURSE PRACTITIONER

## 2022-03-08 PROCEDURE — 96374 THER/PROPH/DIAG INJ IV PUSH: CPT | Performed by: NURSE PRACTITIONER

## 2022-03-08 PROCEDURE — 74176 CT ABD & PELVIS W/O CONTRAST: CPT | Performed by: NURSE PRACTITIONER

## 2022-03-08 PROCEDURE — 81025 URINE PREGNANCY TEST: CPT | Performed by: NURSE PRACTITIONER

## 2022-03-08 PROCEDURE — 80047 BASIC METABLC PNL IONIZED CA: CPT | Performed by: NURSE PRACTITIONER

## 2022-03-08 PROCEDURE — 81002 URINALYSIS NONAUTO W/O SCOPE: CPT | Performed by: NURSE PRACTITIONER

## 2022-03-08 RX ORDER — KETOROLAC TROMETHAMINE 30 MG/ML
15 INJECTION, SOLUTION INTRAMUSCULAR; INTRAVENOUS ONCE
Status: COMPLETED | OUTPATIENT
Start: 2022-03-08 | End: 2022-03-08

## 2022-03-08 RX ORDER — CYCLOBENZAPRINE HCL 10 MG
10 TABLET ORAL 3 TIMES DAILY PRN
Qty: 20 TABLET | Refills: 0 | Status: SHIPPED | OUTPATIENT
Start: 2022-03-08 | End: 2022-03-10

## 2022-03-08 NOTE — TELEPHONE ENCOUNTER
Pt called back. Consulted w/LISET Shah. Pt advised to go to urgent care for assessment. Pt agreed to plan and will go to [address]

## 2022-03-08 NOTE — TELEPHONE ENCOUNTER
1. What are your symptoms? Kidney stone pains in lower back right    2. How long have you been having these symptoms? Since 1 AM     3. Have you done anything already to treat your symptoms? Ibuprofin + tylenol - no help    ADDITIONAL INFO:   Unsure how to be evaluated, has upcoming appointment with SD on Thursday, does not want to wait, does not necessarily want appointment with [facility] today. Please LVM if pt does not answer regarding what to do.

## 2022-03-08 NOTE — ED INITIAL ASSESSMENT (HPI)
Last night c/o low right back pain that radiates to the right flank area. Denies urinary symptoms/fevers.

## 2022-03-10 ENCOUNTER — OFFICE VISIT (OUTPATIENT)
Dept: FAMILY MEDICINE CLINIC | Facility: CLINIC | Age: 47
End: 2022-03-10
Payer: COMMERCIAL

## 2022-03-10 VITALS
BODY MASS INDEX: 36.96 KG/M2 | WEIGHT: 230 LBS | HEART RATE: 68 BPM | DIASTOLIC BLOOD PRESSURE: 86 MMHG | SYSTOLIC BLOOD PRESSURE: 138 MMHG | TEMPERATURE: 98 F | RESPIRATION RATE: 16 BRPM | HEIGHT: 66 IN

## 2022-03-10 DIAGNOSIS — Z12.11 SCREENING FOR COLORECTAL CANCER: ICD-10-CM

## 2022-03-10 DIAGNOSIS — Z12.12 SCREENING FOR COLORECTAL CANCER: ICD-10-CM

## 2022-03-10 DIAGNOSIS — I10 BENIGN ESSENTIAL HYPERTENSION: Primary | ICD-10-CM

## 2022-03-10 DIAGNOSIS — F32.81 PMDD (PREMENSTRUAL DYSPHORIC DISORDER): ICD-10-CM

## 2022-03-10 DIAGNOSIS — E78.00 HYPERCHOLESTEREMIA: ICD-10-CM

## 2022-03-10 PROCEDURE — 99214 OFFICE O/P EST MOD 30 MIN: CPT | Performed by: FAMILY MEDICINE

## 2022-03-10 PROCEDURE — 3008F BODY MASS INDEX DOCD: CPT | Performed by: FAMILY MEDICINE

## 2022-03-10 PROCEDURE — 3075F SYST BP GE 130 - 139MM HG: CPT | Performed by: FAMILY MEDICINE

## 2022-03-10 PROCEDURE — 3079F DIAST BP 80-89 MM HG: CPT | Performed by: FAMILY MEDICINE

## 2022-03-10 RX ORDER — FLUOXETINE HYDROCHLORIDE 20 MG/1
20 CAPSULE ORAL DAILY
Qty: 90 CAPSULE | Refills: 3 | Status: SHIPPED | OUTPATIENT
Start: 2022-03-10

## 2022-03-10 RX ORDER — METOPROLOL SUCCINATE 100 MG/1
100 TABLET, EXTENDED RELEASE ORAL DAILY
Qty: 90 TABLET | Refills: 1 | Status: SHIPPED | OUTPATIENT
Start: 2022-03-10

## 2022-03-10 RX ORDER — METOPROLOL SUCCINATE 50 MG/1
50 TABLET, EXTENDED RELEASE ORAL DAILY
Qty: 90 TABLET | Refills: 1 | Status: CANCELLED | OUTPATIENT
Start: 2022-03-10

## 2022-03-10 RX ORDER — ROSUVASTATIN CALCIUM 20 MG/1
20 TABLET, COATED ORAL NIGHTLY
Qty: 90 TABLET | Refills: 1 | Status: SHIPPED | OUTPATIENT
Start: 2022-03-10

## 2022-03-10 NOTE — PATIENT INSTRUCTIONS
Check fasting blood work    Increase metoprolol to 100mg daily - call if pulse in low 50s. See me in 6 months for follow up.

## 2022-04-07 ENCOUNTER — LAB ENCOUNTER (OUTPATIENT)
Dept: LAB | Age: 47
End: 2022-04-07
Attending: FAMILY MEDICINE
Payer: COMMERCIAL

## 2022-04-07 DIAGNOSIS — E78.00 HYPERCHOLESTEREMIA: ICD-10-CM

## 2022-04-07 DIAGNOSIS — I10 BENIGN ESSENTIAL HYPERTENSION: ICD-10-CM

## 2022-04-07 LAB
ALBUMIN SERPL-MCNC: 4 G/DL (ref 3.4–5)
ALBUMIN/GLOB SERPL: 1.3 {RATIO} (ref 1–2)
ALP LIVER SERPL-CCNC: 62 U/L
ALT SERPL-CCNC: 46 U/L
ANION GAP SERPL CALC-SCNC: 5 MMOL/L (ref 0–18)
AST SERPL-CCNC: 30 U/L (ref 15–37)
BILIRUB SERPL-MCNC: 0.5 MG/DL (ref 0.1–2)
BUN BLD-MCNC: 10 MG/DL (ref 7–18)
CALCIUM BLD-MCNC: 9 MG/DL (ref 8.5–10.1)
CHLORIDE SERPL-SCNC: 106 MMOL/L (ref 98–112)
CHOLEST SERPL-MCNC: 196 MG/DL (ref ?–200)
CO2 SERPL-SCNC: 29 MMOL/L (ref 21–32)
CREAT BLD-MCNC: 0.77 MG/DL
FASTING PATIENT LIPID ANSWER: YES
FASTING STATUS PATIENT QL REPORTED: YES
GLOBULIN PLAS-MCNC: 3.1 G/DL (ref 2.8–4.4)
GLUCOSE BLD-MCNC: 100 MG/DL (ref 70–99)
HDLC SERPL-MCNC: 51 MG/DL (ref 40–59)
LDLC SERPL CALC-MCNC: 122 MG/DL (ref ?–100)
NONHDLC SERPL-MCNC: 145 MG/DL (ref ?–130)
OSMOLALITY SERPL CALC.SUM OF ELEC: 289 MOSM/KG (ref 275–295)
POTASSIUM SERPL-SCNC: 4.5 MMOL/L (ref 3.5–5.1)
PROT SERPL-MCNC: 7.1 G/DL (ref 6.4–8.2)
SODIUM SERPL-SCNC: 140 MMOL/L (ref 136–145)
TRIGL SERPL-MCNC: 129 MG/DL (ref 30–149)
VLDLC SERPL CALC-MCNC: 23 MG/DL (ref 0–30)

## 2022-04-07 PROCEDURE — 80061 LIPID PANEL: CPT

## 2022-04-07 PROCEDURE — 36415 COLL VENOUS BLD VENIPUNCTURE: CPT

## 2022-04-07 PROCEDURE — 80053 COMPREHEN METABOLIC PANEL: CPT

## 2022-04-15 ENCOUNTER — HOSPITAL ENCOUNTER (OUTPATIENT)
Dept: MAMMOGRAPHY | Age: 47
Discharge: HOME | End: 2022-04-15
Attending: OBSTETRICS & GYNECOLOGY
Payer: COMMERCIAL

## 2022-04-15 ENCOUNTER — HOSPITAL ENCOUNTER (OUTPATIENT)
Dept: ULTRASOUND IMAGING | Age: 47
Discharge: HOME | End: 2022-04-15
Attending: OBSTETRICS & GYNECOLOGY
Payer: COMMERCIAL

## 2022-04-15 DIAGNOSIS — R92.2 INCONCLUSIVE MAMMOGRAM: ICD-10-CM

## 2022-04-15 PROCEDURE — 77062 BREAST TOMOSYNTHESIS BI: CPT | Performed by: OBSTETRICS & GYNECOLOGY

## 2022-04-15 PROCEDURE — 76642 ULTRASOUND BREAST LIMITED: CPT | Performed by: OBSTETRICS & GYNECOLOGY

## 2022-04-15 PROCEDURE — 77066 DX MAMMO INCL CAD BI: CPT | Performed by: OBSTETRICS & GYNECOLOGY

## 2022-08-24 ENCOUNTER — TELEPHONE (OUTPATIENT)
Facility: LOCATION | Age: 47
End: 2022-08-24

## 2022-08-24 DIAGNOSIS — J34.2 DEVIATED NASAL SEPTUM: Primary | ICD-10-CM

## 2022-08-24 DIAGNOSIS — J34.3 HYPERTROPHY OF NASAL TURBINATES: ICD-10-CM

## 2022-08-24 NOTE — TELEPHONE ENCOUNTER
Patient called to reschedule surgery for 11/22/2022 will put in covid test and surgical case request.  Patient asware she might have to come in for OV if insurance wants an updated note sherman

## 2022-08-26 PROBLEM — Z12.11 SPECIAL SCREENING FOR MALIGNANT NEOPLASM OF COLON: Status: ACTIVE | Noted: 2022-08-26

## 2022-08-26 PROBLEM — D12.0 BENIGN NEOPLASM OF CECUM: Status: ACTIVE | Noted: 2022-08-26

## 2022-08-26 PROBLEM — D12.5 BENIGN NEOPLASM OF SIGMOID COLON: Status: ACTIVE | Noted: 2022-08-26

## 2022-09-27 ENCOUNTER — TELEPHONE (OUTPATIENT)
Dept: FAMILY MEDICINE CLINIC | Facility: CLINIC | Age: 47
End: 2022-09-27

## 2022-09-27 DIAGNOSIS — E78.00 HYPERCHOLESTEREMIA: Primary | ICD-10-CM

## 2022-09-27 RX ORDER — ROSUVASTATIN CALCIUM 20 MG/1
20 TABLET, COATED ORAL NIGHTLY
Qty: 90 TABLET | Refills: 0 | Status: SHIPPED | OUTPATIENT
Start: 2022-09-27

## 2022-09-27 RX ORDER — METOPROLOL SUCCINATE 100 MG/1
100 TABLET, EXTENDED RELEASE ORAL DAILY
Qty: 90 TABLET | Refills: 0 | Status: SHIPPED | OUTPATIENT
Start: 2022-09-27

## 2022-09-27 NOTE — TELEPHONE ENCOUNTER
See below. Pt seeing you 11.3 for med check  I have sent her rx's. She is also asking for labs prior. I pended cmp/lipid? Does she need a cbc for htn? Please advise thanks.

## 2022-09-27 NOTE — TELEPHONE ENCOUNTER
Pt called to request a refill on the following medication(s)    rosuvastatin 20 MG Oral Tab, and    metoprolol succinate (TOPROL XL) 100 MG Oral Tablet 24 Hr    Be sent to    [facility] [facility] [address], [place] - [address] [phone], [phone]   [facility] [employer] [facility]   Phone: [phone] Fax: [phone]   Hours: Not open 24 hours     Pt is out of both medication(s)    Pt will need an order for complete blood work and has a follow up scheduled for 11/3/2022

## 2022-10-28 ENCOUNTER — LAB ENCOUNTER (OUTPATIENT)
Dept: LAB | Age: 47
End: 2022-10-28
Attending: FAMILY MEDICINE
Payer: COMMERCIAL

## 2022-10-28 DIAGNOSIS — R73.09 ELEVATED GLUCOSE: Primary | ICD-10-CM

## 2022-10-28 DIAGNOSIS — E78.00 HYPERCHOLESTEREMIA: ICD-10-CM

## 2022-10-28 LAB
ALBUMIN SERPL-MCNC: 3.7 G/DL (ref 3.4–5)
ALBUMIN/GLOB SERPL: 0.9 {RATIO} (ref 1–2)
ALP LIVER SERPL-CCNC: 57 U/L
ALT SERPL-CCNC: 35 U/L
ANION GAP SERPL CALC-SCNC: 5 MMOL/L (ref 0–18)
AST SERPL-CCNC: 19 U/L (ref 15–37)
BILIRUB SERPL-MCNC: 0.6 MG/DL (ref 0.1–2)
BUN BLD-MCNC: 13 MG/DL (ref 7–18)
CALCIUM BLD-MCNC: 9.4 MG/DL (ref 8.5–10.1)
CHLORIDE SERPL-SCNC: 107 MMOL/L (ref 98–112)
CHOLEST SERPL-MCNC: 182 MG/DL (ref ?–200)
CO2 SERPL-SCNC: 28 MMOL/L (ref 21–32)
CREAT BLD-MCNC: 0.92 MG/DL
FASTING PATIENT LIPID ANSWER: YES
FASTING STATUS PATIENT QL REPORTED: YES
GFR SERPLBLD BASED ON 1.73 SQ M-ARVRAT: 77 ML/MIN/1.73M2 (ref 60–?)
GLOBULIN PLAS-MCNC: 3.9 G/DL (ref 2.8–4.4)
GLUCOSE BLD-MCNC: 110 MG/DL (ref 70–99)
HDLC SERPL-MCNC: 52 MG/DL (ref 40–59)
LDLC SERPL CALC-MCNC: 105 MG/DL (ref ?–100)
NONHDLC SERPL-MCNC: 130 MG/DL (ref ?–130)
OSMOLALITY SERPL CALC.SUM OF ELEC: 291 MOSM/KG (ref 275–295)
POTASSIUM SERPL-SCNC: 3.9 MMOL/L (ref 3.5–5.1)
PROT SERPL-MCNC: 7.6 G/DL (ref 6.4–8.2)
SODIUM SERPL-SCNC: 140 MMOL/L (ref 136–145)
TRIGL SERPL-MCNC: 141 MG/DL (ref 30–149)
VLDLC SERPL CALC-MCNC: 24 MG/DL (ref 0–30)

## 2022-10-28 PROCEDURE — 80061 LIPID PANEL: CPT | Performed by: FAMILY MEDICINE

## 2022-10-28 PROCEDURE — 80053 COMPREHEN METABOLIC PANEL: CPT | Performed by: FAMILY MEDICINE

## 2022-11-03 ENCOUNTER — OFFICE VISIT (OUTPATIENT)
Dept: FAMILY MEDICINE CLINIC | Facility: CLINIC | Age: 47
End: 2022-11-03
Payer: COMMERCIAL

## 2022-11-03 ENCOUNTER — LAB ENCOUNTER (OUTPATIENT)
Dept: LAB | Age: 47
End: 2022-11-03
Attending: FAMILY MEDICINE
Payer: COMMERCIAL

## 2022-11-03 VITALS
WEIGHT: 244.19 LBS | SYSTOLIC BLOOD PRESSURE: 118 MMHG | HEART RATE: 72 BPM | BODY MASS INDEX: 39.24 KG/M2 | HEIGHT: 66 IN | TEMPERATURE: 98 F | DIASTOLIC BLOOD PRESSURE: 82 MMHG | RESPIRATION RATE: 16 BRPM

## 2022-11-03 DIAGNOSIS — E78.00 HYPERCHOLESTEREMIA: ICD-10-CM

## 2022-11-03 DIAGNOSIS — R73.09 ELEVATED GLUCOSE: ICD-10-CM

## 2022-11-03 DIAGNOSIS — I10 BENIGN ESSENTIAL HYPERTENSION: Primary | ICD-10-CM

## 2022-11-03 DIAGNOSIS — Z23 NEED FOR VACCINATION: ICD-10-CM

## 2022-11-03 DIAGNOSIS — E74.39 GLUCOSE INTOLERANCE: ICD-10-CM

## 2022-11-03 DIAGNOSIS — F32.81 PMDD (PREMENSTRUAL DYSPHORIC DISORDER): ICD-10-CM

## 2022-11-03 PROBLEM — Z12.11 SPECIAL SCREENING FOR MALIGNANT NEOPLASM OF COLON: Status: RESOLVED | Noted: 2022-08-26 | Resolved: 2022-11-03

## 2022-11-03 PROBLEM — D12.5 BENIGN NEOPLASM OF SIGMOID COLON: Status: RESOLVED | Noted: 2022-08-26 | Resolved: 2022-11-03

## 2022-11-03 PROBLEM — D12.0 BENIGN NEOPLASM OF CECUM: Status: RESOLVED | Noted: 2022-08-26 | Resolved: 2022-11-03

## 2022-11-03 LAB
EST. AVERAGE GLUCOSE BLD GHB EST-MCNC: 108 MG/DL (ref 68–126)
HBA1C MFR BLD: 5.4 % (ref ?–5.7)

## 2022-11-03 PROCEDURE — 3074F SYST BP LT 130 MM HG: CPT | Performed by: FAMILY MEDICINE

## 2022-11-03 PROCEDURE — 90686 IIV4 VACC NO PRSV 0.5 ML IM: CPT | Performed by: FAMILY MEDICINE

## 2022-11-03 PROCEDURE — 83036 HEMOGLOBIN GLYCOSYLATED A1C: CPT | Performed by: FAMILY MEDICINE

## 2022-11-03 PROCEDURE — 3008F BODY MASS INDEX DOCD: CPT | Performed by: FAMILY MEDICINE

## 2022-11-03 PROCEDURE — 99214 OFFICE O/P EST MOD 30 MIN: CPT | Performed by: FAMILY MEDICINE

## 2022-11-03 PROCEDURE — 3079F DIAST BP 80-89 MM HG: CPT | Performed by: FAMILY MEDICINE

## 2022-11-03 PROCEDURE — 90471 IMMUNIZATION ADMIN: CPT | Performed by: FAMILY MEDICINE

## 2022-11-03 RX ORDER — METOPROLOL SUCCINATE 100 MG/1
100 TABLET, EXTENDED RELEASE ORAL DAILY
Qty: 90 TABLET | Refills: 1 | Status: SHIPPED | OUTPATIENT
Start: 2022-11-03

## 2022-11-03 RX ORDER — ROSUVASTATIN CALCIUM 20 MG/1
20 TABLET, COATED ORAL NIGHTLY
Qty: 90 TABLET | Refills: 1 | Status: SHIPPED | OUTPATIENT
Start: 2022-11-03

## 2022-11-03 RX ORDER — FLUOXETINE HYDROCHLORIDE 20 MG/1
20 CAPSULE ORAL DAILY
Qty: 90 CAPSULE | Refills: 1 | Status: SHIPPED | OUTPATIENT
Start: 2022-11-03

## 2022-11-07 DIAGNOSIS — I10 HYPERTENSION, UNSPECIFIED TYPE: Primary | ICD-10-CM

## 2022-11-19 ENCOUNTER — LAB ENCOUNTER (OUTPATIENT)
Dept: LAB | Age: 47
End: 2022-11-19
Attending: OTOLARYNGOLOGY
Payer: COMMERCIAL

## 2022-11-19 ENCOUNTER — EKG ENCOUNTER (OUTPATIENT)
Dept: LAB | Age: 47
End: 2022-11-19
Attending: OTOLARYNGOLOGY
Payer: COMMERCIAL

## 2022-11-19 DIAGNOSIS — J34.3 HYPERTROPHY OF NASAL TURBINATES: ICD-10-CM

## 2022-11-19 DIAGNOSIS — I10 HYPERTENSION, UNSPECIFIED TYPE: ICD-10-CM

## 2022-11-19 DIAGNOSIS — J34.2 DEVIATED NASAL SEPTUM: ICD-10-CM

## 2022-11-19 PROCEDURE — 93010 ELECTROCARDIOGRAM REPORT: CPT | Performed by: INTERNAL MEDICINE

## 2022-11-19 PROCEDURE — 93005 ELECTROCARDIOGRAM TRACING: CPT

## 2022-11-20 LAB
ATRIAL RATE: 63 BPM
P AXIS: 35 DEGREES
P-R INTERVAL: 166 MS
Q-T INTERVAL: 420 MS
QRS DURATION: 74 MS
QTC CALCULATION (BEZET): 429 MS
R AXIS: -19 DEGREES
T AXIS: 5 DEGREES
VENTRICULAR RATE: 63 BPM

## 2022-11-21 LAB — SARS-COV-2 RNA RESP QL NAA+PROBE: NOT DETECTED

## 2022-11-22 ENCOUNTER — ANESTHESIA EVENT (OUTPATIENT)
Dept: SURGERY | Facility: HOSPITAL | Age: 47
End: 2022-11-22
Payer: COMMERCIAL

## 2022-11-22 ENCOUNTER — HOSPITAL ENCOUNTER (OUTPATIENT)
Facility: HOSPITAL | Age: 47
Setting detail: HOSPITAL OUTPATIENT SURGERY
Discharge: HOME | End: 2022-11-22
Attending: OTOLARYNGOLOGY | Admitting: OTOLARYNGOLOGY
Payer: COMMERCIAL

## 2022-11-22 ENCOUNTER — ANESTHESIA (OUTPATIENT)
Dept: SURGERY | Facility: HOSPITAL | Age: 47
End: 2022-11-22
Payer: COMMERCIAL

## 2022-11-22 VITALS
TEMPERATURE: 97 F | RESPIRATION RATE: 16 BRPM | HEART RATE: 78 BPM | BODY MASS INDEX: 39.07 KG/M2 | WEIGHT: 246 LBS | OXYGEN SATURATION: 96 % | HEIGHT: 66.5 IN | DIASTOLIC BLOOD PRESSURE: 95 MMHG | SYSTOLIC BLOOD PRESSURE: 147 MMHG

## 2022-11-22 LAB — B-HCG UR QL: NEGATIVE

## 2022-11-22 PROCEDURE — 30140 RESECT INFERIOR TURBINATE: CPT | Performed by: OTOLARYNGOLOGY

## 2022-11-22 PROCEDURE — 09BM0ZZ EXCISION OF NASAL SEPTUM, OPEN APPROACH: ICD-10-PCS | Performed by: OTOLARYNGOLOGY

## 2022-11-22 PROCEDURE — 30520 REPAIR OF NASAL SEPTUM: CPT | Performed by: OTOLARYNGOLOGY

## 2022-11-22 RX ORDER — LIDOCAINE HYDROCHLORIDE AND EPINEPHRINE 10; 10 MG/ML; UG/ML
INJECTION, SOLUTION INFILTRATION; PERINEURAL AS NEEDED
Status: DISCONTINUED | OUTPATIENT
Start: 2022-11-22 | End: 2022-11-22 | Stop reason: HOSPADM

## 2022-11-22 RX ORDER — ONDANSETRON 2 MG/ML
4 INJECTION INTRAMUSCULAR; INTRAVENOUS EVERY 6 HOURS PRN
Status: DISCONTINUED | OUTPATIENT
Start: 2022-11-22 | End: 2022-11-22

## 2022-11-22 RX ORDER — HYDROCODONE BITARTRATE AND ACETAMINOPHEN 5; 325 MG/1; MG/1
1 TABLET ORAL ONCE AS NEEDED
Status: COMPLETED | OUTPATIENT
Start: 2022-11-22 | End: 2022-11-22

## 2022-11-22 RX ORDER — MIDAZOLAM HYDROCHLORIDE 1 MG/ML
1 INJECTION INTRAMUSCULAR; INTRAVENOUS EVERY 5 MIN PRN
Status: DISCONTINUED | OUTPATIENT
Start: 2022-11-22 | End: 2022-11-22

## 2022-11-22 RX ORDER — HYDROCODONE BITARTRATE AND ACETAMINOPHEN 5; 325 MG/1; MG/1
1-2 TABLET ORAL EVERY 4 HOURS PRN
Qty: 15 TABLET | Refills: 0 | Status: SHIPPED | OUTPATIENT
Start: 2022-11-22

## 2022-11-22 RX ORDER — SCOLOPAMINE TRANSDERMAL SYSTEM 1 MG/1
1 PATCH, EXTENDED RELEASE TRANSDERMAL ONCE
Status: DISCONTINUED | OUTPATIENT
Start: 2022-11-22 | End: 2022-11-22 | Stop reason: HOSPADM

## 2022-11-22 RX ORDER — CEFADROXIL 500 MG/1
500 CAPSULE ORAL 2 TIMES DAILY
Qty: 14 CAPSULE | Refills: 0 | Status: SHIPPED | OUTPATIENT
Start: 2022-11-22 | End: 2022-11-29

## 2022-11-22 RX ORDER — SODIUM CHLORIDE, SODIUM LACTATE, POTASSIUM CHLORIDE, CALCIUM CHLORIDE 600; 310; 30; 20 MG/100ML; MG/100ML; MG/100ML; MG/100ML
INJECTION, SOLUTION INTRAVENOUS CONTINUOUS
Status: DISCONTINUED | OUTPATIENT
Start: 2022-11-22 | End: 2022-11-22

## 2022-11-22 RX ORDER — HYDROMORPHONE HYDROCHLORIDE 1 MG/ML
0.4 INJECTION, SOLUTION INTRAMUSCULAR; INTRAVENOUS; SUBCUTANEOUS EVERY 5 MIN PRN
Status: DISCONTINUED | OUTPATIENT
Start: 2022-11-22 | End: 2022-11-22

## 2022-11-22 RX ORDER — MEPERIDINE HYDROCHLORIDE 25 MG/ML
12.5 INJECTION INTRAMUSCULAR; INTRAVENOUS; SUBCUTANEOUS AS NEEDED
Status: DISCONTINUED | OUTPATIENT
Start: 2022-11-22 | End: 2022-11-22

## 2022-11-22 RX ORDER — DIPHENHYDRAMINE HYDROCHLORIDE 50 MG/ML
12.5 INJECTION INTRAMUSCULAR; INTRAVENOUS AS NEEDED
Status: DISCONTINUED | OUTPATIENT
Start: 2022-11-22 | End: 2022-11-22

## 2022-11-22 RX ORDER — NALOXONE HYDROCHLORIDE 0.4 MG/ML
80 INJECTION, SOLUTION INTRAMUSCULAR; INTRAVENOUS; SUBCUTANEOUS AS NEEDED
Status: DISCONTINUED | OUTPATIENT
Start: 2022-11-22 | End: 2022-11-22

## 2022-11-22 RX ORDER — EPHEDRINE SULFATE 50 MG/ML
INJECTION INTRAVENOUS AS NEEDED
Status: DISCONTINUED | OUTPATIENT
Start: 2022-11-22 | End: 2022-11-22 | Stop reason: SURG

## 2022-11-22 RX ORDER — LIDOCAINE HYDROCHLORIDE 10 MG/ML
INJECTION, SOLUTION EPIDURAL; INFILTRATION; INTRACAUDAL; PERINEURAL AS NEEDED
Status: DISCONTINUED | OUTPATIENT
Start: 2022-11-22 | End: 2022-11-22 | Stop reason: SURG

## 2022-11-22 RX ORDER — HYDROMORPHONE HYDROCHLORIDE 1 MG/ML
0.2 INJECTION, SOLUTION INTRAMUSCULAR; INTRAVENOUS; SUBCUTANEOUS EVERY 5 MIN PRN
Status: DISCONTINUED | OUTPATIENT
Start: 2022-11-22 | End: 2022-11-22

## 2022-11-22 RX ORDER — CEFAZOLIN SODIUM/WATER 2 G/20 ML
2 SYRINGE (ML) INTRAVENOUS ONCE
Status: DISCONTINUED | OUTPATIENT
Start: 2022-11-22 | End: 2022-11-22 | Stop reason: HOSPADM

## 2022-11-22 RX ORDER — HYDROMORPHONE HYDROCHLORIDE 1 MG/ML
0.6 INJECTION, SOLUTION INTRAMUSCULAR; INTRAVENOUS; SUBCUTANEOUS EVERY 5 MIN PRN
Status: DISCONTINUED | OUTPATIENT
Start: 2022-11-22 | End: 2022-11-22

## 2022-11-22 RX ORDER — ACETAMINOPHEN 500 MG
1000 TABLET ORAL ONCE AS NEEDED
Status: COMPLETED | OUTPATIENT
Start: 2022-11-22 | End: 2022-11-22

## 2022-11-22 RX ORDER — HYDROCODONE BITARTRATE AND ACETAMINOPHEN 5; 325 MG/1; MG/1
2 TABLET ORAL ONCE AS NEEDED
Status: COMPLETED | OUTPATIENT
Start: 2022-11-22 | End: 2022-11-22

## 2022-11-22 RX ORDER — DEXAMETHASONE SODIUM PHOSPHATE 4 MG/ML
VIAL (ML) INJECTION AS NEEDED
Status: DISCONTINUED | OUTPATIENT
Start: 2022-11-22 | End: 2022-11-22 | Stop reason: SURG

## 2022-11-22 RX ORDER — CEFAZOLIN SODIUM/WATER 2 G/20 ML
SYRINGE (ML) INTRAVENOUS
Status: DISCONTINUED
Start: 2022-11-22 | End: 2022-11-22

## 2022-11-22 RX ORDER — PROCHLORPERAZINE EDISYLATE 5 MG/ML
5 INJECTION INTRAMUSCULAR; INTRAVENOUS EVERY 8 HOURS PRN
Status: DISCONTINUED | OUTPATIENT
Start: 2022-11-22 | End: 2022-11-22

## 2022-11-22 RX ORDER — ACETAMINOPHEN 500 MG
1000 TABLET ORAL ONCE
Status: DISCONTINUED | OUTPATIENT
Start: 2022-11-22 | End: 2022-11-22 | Stop reason: HOSPADM

## 2022-11-22 RX ORDER — ONDANSETRON 2 MG/ML
INJECTION INTRAMUSCULAR; INTRAVENOUS AS NEEDED
Status: DISCONTINUED | OUTPATIENT
Start: 2022-11-22 | End: 2022-11-22 | Stop reason: SURG

## 2022-11-22 RX ORDER — MIDAZOLAM HYDROCHLORIDE 1 MG/ML
INJECTION INTRAMUSCULAR; INTRAVENOUS AS NEEDED
Status: DISCONTINUED | OUTPATIENT
Start: 2022-11-22 | End: 2022-11-22 | Stop reason: SURG

## 2022-11-22 RX ADMIN — ONDANSETRON 4 MG: 2 INJECTION INTRAMUSCULAR; INTRAVENOUS at 12:10:00

## 2022-11-22 RX ADMIN — DEXAMETHASONE SODIUM PHOSPHATE 4 MG: 4 MG/ML VIAL (ML) INJECTION at 12:02:00

## 2022-11-22 RX ADMIN — LIDOCAINE HYDROCHLORIDE 5 ML: 10 INJECTION, SOLUTION EPIDURAL; INFILTRATION; INTRACAUDAL; PERINEURAL at 11:59:00

## 2022-11-22 RX ADMIN — EPHEDRINE SULFATE 10 MG: 50 INJECTION INTRAVENOUS at 12:20:00

## 2022-11-22 RX ADMIN — EPHEDRINE SULFATE 10 MG: 50 INJECTION INTRAVENOUS at 12:12:00

## 2022-11-22 RX ADMIN — EPHEDRINE SULFATE 10 MG: 50 INJECTION INTRAVENOUS at 12:14:00

## 2022-11-22 RX ADMIN — SODIUM CHLORIDE, SODIUM LACTATE, POTASSIUM CHLORIDE, CALCIUM CHLORIDE: 600; 310; 30; 20 INJECTION, SOLUTION INTRAVENOUS at 12:28:00

## 2022-11-22 RX ADMIN — SODIUM CHLORIDE, SODIUM LACTATE, POTASSIUM CHLORIDE, CALCIUM CHLORIDE: 600; 310; 30; 20 INJECTION, SOLUTION INTRAVENOUS at 11:54:00

## 2022-11-22 RX ADMIN — MIDAZOLAM HYDROCHLORIDE 2 MG: 1 INJECTION INTRAMUSCULAR; INTRAVENOUS at 11:54:00

## 2022-11-22 RX ADMIN — EPHEDRINE SULFATE 10 MG: 50 INJECTION INTRAVENOUS at 12:25:00

## 2022-11-22 NOTE — INTERVAL H&P NOTE
Pre-op Diagnosis: Deviated nasal septum [J34.2]  Hypertrophy of nasal turbinates [J34.3]    The above referenced H&P was reviewed by Flavio Fang MD on 11/22/2022, the patient was examined and no significant changes have occurred in the patient's condition since the H&P was performed. I discussed with the patient and/or legal representative the potential benefits, risks and side effects of this procedure; the likelihood of the patient achieving goals; and potential problems that might occur during recuperation. I discussed reasonable alternatives to the procedure, including risks, benefits and side effects related to the alternatives and risks related to not receiving this procedure. We will proceed with procedure as planned.

## 2022-11-22 NOTE — OPERATIVE REPORT
[facility]  Operative Note    Grace Silverio Location: OR   [facility] [id number] MRN [id number]   Admission Date 11/22/2022 Operation Date 11/22/2022   Attending Physician Ether Simmonds, MD Operating Physician Flavio Fang MD       OPERATIVE REPORT   PREOPERATIVE DIAGNOSIS:   1. Nasal septal deviation. 2. Inferior turbinate hypertrophy. POSTOPERATIVE DIAGNOSIS:   1. Nasal septal deviation. 2. Inferior turbinate hypertrophy. PROCEDURE PERFORMED:   1. Nasal septoplasty. 2. Outfracture and shaver submucous resection of inferior turbinates. ANESTHESIA: General endotracheal.   PROCEDURE AND FINDINGS: After satisfactory general endotracheal anesthesia induction, the patient was prepared for the procedure, and 1% lidocaine with epinephrine was injected at the lateral nasal wall, the inferior turbinates, and the nasal septum. Next, 4% cocaine packs were placed into both sides of the nose. The area was then prepped and draped in the usual sterile fashion. A #15-blade was used to make an incision at the nasal septum. A mucoperichondrial flap was then raised using a Chambers elevator. An incision was made at the bony cartilage junction with the Chambers elevator, then a mucoperiosteal flap was raised on the other side. Deviated nasal vomer and ethmoid bone were removed using Tracie forceps. Inferiorly, the maxillary crest spur was removed with an osteotome. The septal mucosal edges were then closed with the septal stapler. A small drainage incision was made posteriorly. Attention was turned to the turbinates. Each inferior turbinate was outfractured with a #7 osteotome. The shaver was then used to make submucosal tunnels in each inferior turbinate, removing mucosa and bone thereby reducing its size. At this point, the procedure ended. Juliene Marinas  was placed into both sides of the nose. The patient was then awakened, extubated, and transferred to the recovery room in stable condition.    FINDINGS: Deviated septum with spur to the right with bilateral turbinate hypertrophy.     Krys Harris MD

## 2022-11-29 ENCOUNTER — OFFICE VISIT (OUTPATIENT)
Facility: LOCATION | Age: 47
End: 2022-11-29
Payer: COMMERCIAL

## 2022-11-29 DIAGNOSIS — J34.2 DEVIATED NASAL SEPTUM: Primary | ICD-10-CM

## 2022-11-29 PROCEDURE — 99024 POSTOP FOLLOW-UP VISIT: CPT | Performed by: OTOLARYNGOLOGY

## 2022-11-29 NOTE — PROGRESS NOTES
This patient is status post endoscopic sinus procedure in for her first postoperative debridement procedure. Patient was topically anesthetized with anesthetic spray. She was debrided using the 0 degree sinus scope bilaterally. Patient tolerated the procedure well. She was given  routine post debridement instructions. She will follow-up in 2 weeks. She was nasal saline ischemia and Flonase. She is healing well status post septoplasty.

## 2022-12-09 ENCOUNTER — OFFICE VISIT (OUTPATIENT)
Facility: LOCATION | Age: 47
End: 2022-12-09
Payer: COMMERCIAL

## 2022-12-09 DIAGNOSIS — J34.2 DEVIATED NASAL SEPTUM: Primary | ICD-10-CM

## 2022-12-09 PROCEDURE — 99024 POSTOP FOLLOW-UP VISIT: CPT | Performed by: OTOLARYNGOLOGY

## 2022-12-09 NOTE — PROGRESS NOTES
This patient is status post endoscopic sinus procedure in for her second postoperative debridement procedure. Patient was topically anesthetized with anesthetic spray. She was debrided using the 0 degree sinus scope bilaterally. Patient tolerated the procedure well. She was given  routine post debridement instructions. She will follow-up as needed. She may continue with saline and she will use Flonase once a day as needed. She is healing well with a good airway. I can see all the way back on both sides.

## 2023-04-14 ENCOUNTER — HOSPITAL ENCOUNTER (OUTPATIENT)
Dept: MAMMOGRAPHY | Age: 48
Discharge: HOME | End: 2023-04-14
Attending: OBSTETRICS & GYNECOLOGY
Payer: COMMERCIAL

## 2023-04-14 DIAGNOSIS — Z12.31 ENCOUNTER FOR SCREENING MAMMOGRAM FOR MALIGNANT NEOPLASM OF BREAST: ICD-10-CM

## 2023-04-14 PROCEDURE — 77067 SCR MAMMO BI INCL CAD: CPT | Performed by: OBSTETRICS & GYNECOLOGY

## 2023-04-14 PROCEDURE — 77063 BREAST TOMOSYNTHESIS BI: CPT | Performed by: OBSTETRICS & GYNECOLOGY

## 2023-05-09 ENCOUNTER — TELEPHONE (OUTPATIENT)
Dept: FAMILY MEDICINE CLINIC | Facility: CLINIC | Age: 48
End: 2023-05-09

## 2023-05-09 DIAGNOSIS — I10 BENIGN ESSENTIAL HYPERTENSION: Primary | ICD-10-CM

## 2023-05-09 DIAGNOSIS — E74.39 GLUCOSE INTOLERANCE: ICD-10-CM

## 2023-05-09 DIAGNOSIS — E78.00 HYPERCHOLESTEREMIA: ICD-10-CM

## 2023-05-09 NOTE — TELEPHONE ENCOUNTER
Pt scheduled CPX for 6/9, requesting lab orders so she may complete prior. Please place if appropriate, thank you!

## 2023-05-25 ENCOUNTER — LAB ENCOUNTER (OUTPATIENT)
Dept: LAB | Age: 48
End: 2023-05-25
Attending: FAMILY MEDICINE
Payer: COMMERCIAL

## 2023-05-25 DIAGNOSIS — I10 BENIGN ESSENTIAL HYPERTENSION: ICD-10-CM

## 2023-05-25 DIAGNOSIS — E74.39 GLUCOSE INTOLERANCE: ICD-10-CM

## 2023-05-25 DIAGNOSIS — E78.00 HYPERCHOLESTEREMIA: ICD-10-CM

## 2023-05-25 LAB
ALBUMIN SERPL-MCNC: 4 G/DL (ref 3.4–5)
ALBUMIN/GLOB SERPL: 1 {RATIO} (ref 1–2)
ALP LIVER SERPL-CCNC: 58 U/L
ALT SERPL-CCNC: 37 U/L
ANION GAP SERPL CALC-SCNC: 4 MMOL/L (ref 0–18)
AST SERPL-CCNC: 23 U/L (ref 15–37)
BASOPHILS # BLD AUTO: 0.06 X10(3) UL (ref 0–0.2)
BASOPHILS NFR BLD AUTO: 0.7 %
BILIRUB SERPL-MCNC: 0.6 MG/DL (ref 0.1–2)
BUN BLD-MCNC: 12 MG/DL (ref 7–18)
CALCIUM BLD-MCNC: 9.6 MG/DL (ref 8.5–10.1)
CHLORIDE SERPL-SCNC: 105 MMOL/L (ref 98–112)
CHOLEST SERPL-MCNC: 187 MG/DL (ref ?–200)
CO2 SERPL-SCNC: 29 MMOL/L (ref 21–32)
CREAT BLD-MCNC: 0.88 MG/DL
EOSINOPHIL # BLD AUTO: 0.27 X10(3) UL (ref 0–0.7)
EOSINOPHIL NFR BLD AUTO: 3.3 %
ERYTHROCYTE [DISTWIDTH] IN BLOOD BY AUTOMATED COUNT: 13.1 %
EST. AVERAGE GLUCOSE BLD GHB EST-MCNC: 114 MG/DL (ref 68–126)
FASTING PATIENT LIPID ANSWER: YES
FASTING STATUS PATIENT QL REPORTED: YES
GFR SERPLBLD BASED ON 1.73 SQ M-ARVRAT: 81 ML/MIN/1.73M2 (ref 60–?)
GLOBULIN PLAS-MCNC: 4.1 G/DL (ref 2.8–4.4)
GLUCOSE BLD-MCNC: 119 MG/DL (ref 70–99)
HBA1C MFR BLD: 5.6 % (ref ?–5.7)
HCT VFR BLD AUTO: 44.4 %
HDLC SERPL-MCNC: 51 MG/DL (ref 40–59)
HGB BLD-MCNC: 14.9 G/DL
IMM GRANULOCYTES # BLD AUTO: 0.03 X10(3) UL (ref 0–1)
IMM GRANULOCYTES NFR BLD: 0.4 %
LDLC SERPL CALC-MCNC: 110 MG/DL (ref ?–100)
LYMPHOCYTES # BLD AUTO: 2.17 X10(3) UL (ref 1–4)
LYMPHOCYTES NFR BLD AUTO: 26.4 %
MCH RBC QN AUTO: 30.3 PG (ref 26–34)
MCHC RBC AUTO-ENTMCNC: 33.6 G/DL (ref 31–37)
MCV RBC AUTO: 90.4 FL
MONOCYTES # BLD AUTO: 0.5 X10(3) UL (ref 0.1–1)
MONOCYTES NFR BLD AUTO: 6.1 %
NEUTROPHILS # BLD AUTO: 5.2 X10 (3) UL (ref 1.5–7.7)
NEUTROPHILS # BLD AUTO: 5.2 X10(3) UL (ref 1.5–7.7)
NEUTROPHILS NFR BLD AUTO: 63.1 %
NONHDLC SERPL-MCNC: 136 MG/DL (ref ?–130)
OSMOLALITY SERPL CALC.SUM OF ELEC: 287 MOSM/KG (ref 275–295)
PLATELET # BLD AUTO: 362 10(3)UL (ref 150–450)
POTASSIUM SERPL-SCNC: 3.9 MMOL/L (ref 3.5–5.1)
PROT SERPL-MCNC: 8.1 G/DL (ref 6.4–8.2)
RBC # BLD AUTO: 4.91 X10(6)UL
SODIUM SERPL-SCNC: 138 MMOL/L (ref 136–145)
TRIGL SERPL-MCNC: 146 MG/DL (ref 30–149)
TSI SER-ACNC: 3.13 MIU/ML (ref 0.36–3.74)
VLDLC SERPL CALC-MCNC: 25 MG/DL (ref 0–30)
WBC # BLD AUTO: 8.2 X10(3) UL (ref 4–11)

## 2023-05-25 PROCEDURE — 80061 LIPID PANEL: CPT

## 2023-05-25 PROCEDURE — 85025 COMPLETE CBC W/AUTO DIFF WBC: CPT

## 2023-05-25 PROCEDURE — 36415 COLL VENOUS BLD VENIPUNCTURE: CPT

## 2023-05-25 PROCEDURE — 84443 ASSAY THYROID STIM HORMONE: CPT

## 2023-05-25 PROCEDURE — 83036 HEMOGLOBIN GLYCOSYLATED A1C: CPT

## 2023-05-25 PROCEDURE — 80053 COMPREHEN METABOLIC PANEL: CPT

## 2023-06-09 ENCOUNTER — OFFICE VISIT (OUTPATIENT)
Dept: FAMILY MEDICINE CLINIC | Facility: CLINIC | Age: 48
End: 2023-06-09
Payer: COMMERCIAL

## 2023-06-09 VITALS
WEIGHT: 244 LBS | HEART RATE: 72 BPM | HEIGHT: 66 IN | OXYGEN SATURATION: 97 % | BODY MASS INDEX: 39.21 KG/M2 | SYSTOLIC BLOOD PRESSURE: 130 MMHG | DIASTOLIC BLOOD PRESSURE: 84 MMHG

## 2023-06-09 DIAGNOSIS — Z00.00 ROUTINE GENERAL MEDICAL EXAMINATION AT A HEALTH CARE FACILITY: Primary | ICD-10-CM

## 2023-06-09 DIAGNOSIS — N95.1 PERIMENOPAUSAL: ICD-10-CM

## 2023-06-09 DIAGNOSIS — R06.83 SNORING: ICD-10-CM

## 2023-06-09 DIAGNOSIS — I10 BENIGN ESSENTIAL HYPERTENSION: ICD-10-CM

## 2023-06-09 DIAGNOSIS — E74.39 GLUCOSE INTOLERANCE: ICD-10-CM

## 2023-06-09 DIAGNOSIS — E78.00 HYPERCHOLESTEREMIA: ICD-10-CM

## 2023-06-09 DIAGNOSIS — G47.30 SLEEP APNEA, UNSPECIFIED TYPE: ICD-10-CM

## 2023-06-09 PROCEDURE — 99396 PREV VISIT EST AGE 40-64: CPT | Performed by: FAMILY MEDICINE

## 2023-06-09 PROCEDURE — 3075F SYST BP GE 130 - 139MM HG: CPT | Performed by: FAMILY MEDICINE

## 2023-06-09 PROCEDURE — 3079F DIAST BP 80-89 MM HG: CPT | Performed by: FAMILY MEDICINE

## 2023-06-09 PROCEDURE — 3008F BODY MASS INDEX DOCD: CPT | Performed by: FAMILY MEDICINE

## 2023-06-09 RX ORDER — ROSUVASTATIN CALCIUM 20 MG/1
20 TABLET, COATED ORAL NIGHTLY
Qty: 90 TABLET | Refills: 1 | Status: SHIPPED | OUTPATIENT
Start: 2023-06-09

## 2023-06-09 RX ORDER — METOPROLOL SUCCINATE 100 MG/1
100 TABLET, EXTENDED RELEASE ORAL DAILY
Qty: 90 TABLET | Refills: 1 | Status: SHIPPED | OUTPATIENT
Start: 2023-06-09

## 2023-06-09 RX ORDER — FLUOXETINE HYDROCHLORIDE 20 MG/1
20 CAPSULE ORAL DAILY
Qty: 90 CAPSULE | Refills: 1 | Status: SHIPPED | OUTPATIENT
Start: 2023-06-09

## 2023-07-13 ENCOUNTER — TELEPHONE (OUTPATIENT)
Dept: FAMILY MEDICINE CLINIC | Facility: CLINIC | Age: 48
End: 2023-07-13

## 2023-07-13 RX ORDER — SCOLOPAMINE TRANSDERMAL SYSTEM 1 MG/1
1 PATCH, EXTENDED RELEASE TRANSDERMAL
Qty: 21 PATCH | Refills: 0 | Status: SHIPPED | OUTPATIENT
Start: 2023-07-13

## 2023-07-13 NOTE — TELEPHONE ENCOUNTER
I pended the patch. Ok to send if pharmacy is verified. She should avoid alcohol while using the patch. Follow up if worse or if no better.

## 2023-07-13 NOTE — TELEPHONE ENCOUNTER
S/w Emily. 2-3 years ago she sts she saw Seleta Villasenor for vertigo. Has not had an episode for some time    Recently has been experiencing intermittent light sensations of dizziness and nausea. \"Not as bad as I had it in the past\"  Symptoms are present at random, worse in the morning and at afternoon.   Denies HA, visual changes     Would like something to have on hand in addition to Dramamine   Asking for scopolamine patches    Reports that [employer]" she used these on a cruise    Leaving for [place] on Saturday morning

## 2023-07-13 NOTE — TELEPHONE ENCOUNTER
S/w Emily. She concerns pharmacy. Instructed to avoid alcohol while using the patch, and follow up with worsening or persistent symptoms. Rx sent as was pended by Little Jauregui PA-C. Pt verbalized understanding and is agreeable to plan. No further questions at this time.

## 2023-07-13 NOTE — TELEPHONE ENCOUNTER
PT C/O Vertigo onset she has had them in the past.   She is about to travel and would like a patch for same. Pls advise.  TY

## 2023-07-24 NOTE — LETTER
[facility]  [place], [address] [facility]  Ph: [phone]   Fax: [phone]     March 24, 2022 Exam Date: 3/3/22      ID: [id number]      Reports Sent To: Markos Lawson MD        Dear Fidelina Cuenca,    Thank you for choosing [address] [place] [address] for your recent breast imaging procedure. It is recommended that you have additional breast imaging such as additional mammographic views and/or ultrasound for a complete evaluation. Most such findings are benign (not cancer). If you have not received a call to schedule your appointment within one week, please call the mammography  at [phone] for [facility] or [phone] for [facility] if you have not already   done so. Although mammography is the most accurate method for early detection, not all cancers are found through mammography. A breast finding of concern should never be ignored. If you notice changes in your breast(s), you should bring them to your healthcare provider's attention immediately. A report of your results has been sent to your referring healthcare provider. Please contact your doctor's office to discuss the specifics of your exam.     Your examination images and report are a part of your medical file at [address] are responsible for informing any new healthcare provider or mammography facility of the date and location of this examination. Sincerely,    Sonia Posey MD  Radiologist   [facility] has been designated a [address],[place] [address] by the [facility] of Radiology. We are one of 18 hospitals or imaging facilities in [address] [place] [address] to have earned this designation, which [facility] has met the requirements of the [employer] mandatory mammography accreditation program.     [facility].  [address] [facility], [address]  Ph: [phone]   Fax: [phone]        [facility] [address] [facility] [address]  [place] [id number]
22-May-2023

## 2023-09-13 ENCOUNTER — TELEPHONE (OUTPATIENT)
Dept: SLEEP CENTER | Age: 48
End: 2023-09-13

## 2023-09-19 ENCOUNTER — TELEPHONE (OUTPATIENT)
Dept: SLEEP CENTER | Age: 48
End: 2023-09-19

## 2023-10-19 ENCOUNTER — TELEPHONE (OUTPATIENT)
Dept: FAMILY MEDICINE CLINIC | Facility: CLINIC | Age: 48
End: 2023-10-19

## 2023-10-19 NOTE — TELEPHONE ENCOUNTER
Dr. Narayan Parikh does weight loss meds but recommends that patient calls insurance to see what they will typically approve and bring to visit. Isac Casas also will prescribe short term.

## 2023-10-19 NOTE — TELEPHONE ENCOUNTER
Pt stating that [facility] that Katie Bain referred her to does not have an opneing for 6 months and pt is asking if Katie Bain can prescribe [facility] rx or if a different doctor in the practice is able to. Please advise.  Thank you

## 2023-10-20 NOTE — TELEPHONE ENCOUNTER
Pt called back. Advised of below. She voiced understanding. Will check on this with insurance and re-book with another provider if needbe.

## 2023-10-26 ENCOUNTER — OFFICE VISIT (OUTPATIENT)
Dept: FAMILY MEDICINE CLINIC | Facility: CLINIC | Age: 48
End: 2023-10-26

## 2023-10-26 VITALS
HEIGHT: 66 IN | TEMPERATURE: 100 F | RESPIRATION RATE: 20 BRPM | BODY MASS INDEX: 39.37 KG/M2 | WEIGHT: 245 LBS | SYSTOLIC BLOOD PRESSURE: 134 MMHG | DIASTOLIC BLOOD PRESSURE: 88 MMHG | HEART RATE: 71 BPM | OXYGEN SATURATION: 96 %

## 2023-10-26 DIAGNOSIS — R05.1 ACUTE COUGH: ICD-10-CM

## 2023-10-26 DIAGNOSIS — J01.90 ACUTE SINUSITIS, RECURRENCE NOT SPECIFIED, UNSPECIFIED LOCATION: Primary | ICD-10-CM

## 2023-10-26 PROCEDURE — 3079F DIAST BP 80-89 MM HG: CPT | Performed by: FAMILY MEDICINE

## 2023-10-26 PROCEDURE — 3008F BODY MASS INDEX DOCD: CPT | Performed by: FAMILY MEDICINE

## 2023-10-26 PROCEDURE — 3075F SYST BP GE 130 - 139MM HG: CPT | Performed by: FAMILY MEDICINE

## 2023-10-26 PROCEDURE — 99213 OFFICE O/P EST LOW 20 MIN: CPT | Performed by: FAMILY MEDICINE

## 2023-10-26 RX ORDER — BENZONATATE 200 MG/1
200 CAPSULE ORAL 3 TIMES DAILY PRN
Qty: 30 CAPSULE | Refills: 0 | Status: SHIPPED | OUTPATIENT
Start: 2023-10-26

## 2023-10-26 RX ORDER — CEFDINIR 300 MG/1
300 CAPSULE ORAL 2 TIMES DAILY
Qty: 20 CAPSULE | Refills: 0 | Status: SHIPPED | OUTPATIENT
Start: 2023-10-26

## 2023-12-13 ENCOUNTER — LAB ENCOUNTER (OUTPATIENT)
Dept: LAB | Age: 48
End: 2023-12-13
Attending: FAMILY MEDICINE
Payer: COMMERCIAL

## 2023-12-13 DIAGNOSIS — E78.00 HYPERCHOLESTEREMIA: ICD-10-CM

## 2023-12-13 DIAGNOSIS — I10 BENIGN ESSENTIAL HYPERTENSION: ICD-10-CM

## 2023-12-13 DIAGNOSIS — E74.39 GLUCOSE INTOLERANCE: ICD-10-CM

## 2023-12-13 DIAGNOSIS — N95.1 PERIMENOPAUSAL: ICD-10-CM

## 2023-12-13 LAB
ALBUMIN SERPL-MCNC: 3.9 G/DL (ref 3.4–5)
ALBUMIN/GLOB SERPL: 1.1 {RATIO} (ref 1–2)
ALP LIVER SERPL-CCNC: 68 U/L
ALT SERPL-CCNC: 38 U/L
ANION GAP SERPL CALC-SCNC: 4 MMOL/L (ref 0–18)
AST SERPL-CCNC: 23 U/L (ref 15–37)
BILIRUB SERPL-MCNC: 0.5 MG/DL (ref 0.1–2)
BUN BLD-MCNC: 13 MG/DL (ref 9–23)
CALCIUM BLD-MCNC: 9 MG/DL (ref 8.5–10.1)
CHLORIDE SERPL-SCNC: 108 MMOL/L (ref 98–112)
CHOLEST SERPL-MCNC: 193 MG/DL (ref ?–200)
CO2 SERPL-SCNC: 26 MMOL/L (ref 21–32)
CREAT BLD-MCNC: 0.81 MG/DL
EGFRCR SERPLBLD CKD-EPI 2021: 88 ML/MIN/1.73M2 (ref 60–?)
EST. AVERAGE GLUCOSE BLD GHB EST-MCNC: 111 MG/DL (ref 68–126)
ESTRADIOL SERPL-MCNC: 113.8 PG/ML
FASTING PATIENT LIPID ANSWER: YES
FASTING STATUS PATIENT QL REPORTED: YES
FSH SERPL-ACNC: 13.8 MIU/ML
GLOBULIN PLAS-MCNC: 3.5 G/DL (ref 2.8–4.4)
GLUCOSE BLD-MCNC: 105 MG/DL (ref 70–99)
HBA1C MFR BLD: 5.5 % (ref ?–5.7)
HDLC SERPL-MCNC: 51 MG/DL (ref 40–59)
LDLC SERPL CALC-MCNC: 117 MG/DL (ref ?–100)
LH SERPL-ACNC: 35.4 MIU/ML
NONHDLC SERPL-MCNC: 142 MG/DL (ref ?–130)
OSMOLALITY SERPL CALC.SUM OF ELEC: 286 MOSM/KG (ref 275–295)
POTASSIUM SERPL-SCNC: 4.2 MMOL/L (ref 3.5–5.1)
PROT SERPL-MCNC: 7.4 G/DL (ref 6.4–8.2)
SODIUM SERPL-SCNC: 138 MMOL/L (ref 136–145)
TRIGL SERPL-MCNC: 144 MG/DL (ref 30–149)
VLDLC SERPL CALC-MCNC: 25 MG/DL (ref 0–30)

## 2023-12-13 PROCEDURE — 83036 HEMOGLOBIN GLYCOSYLATED A1C: CPT | Performed by: FAMILY MEDICINE

## 2023-12-13 PROCEDURE — 80061 LIPID PANEL: CPT | Performed by: FAMILY MEDICINE

## 2023-12-13 PROCEDURE — 82670 ASSAY OF TOTAL ESTRADIOL: CPT | Performed by: FAMILY MEDICINE

## 2023-12-13 PROCEDURE — 83002 ASSAY OF GONADOTROPIN (LH): CPT | Performed by: FAMILY MEDICINE

## 2023-12-13 PROCEDURE — 83001 ASSAY OF GONADOTROPIN (FSH): CPT | Performed by: FAMILY MEDICINE

## 2023-12-13 PROCEDURE — 80053 COMPREHEN METABOLIC PANEL: CPT | Performed by: FAMILY MEDICINE

## 2023-12-13 RX ORDER — ROSUVASTATIN CALCIUM 20 MG/1
20 TABLET, COATED ORAL NIGHTLY
Qty: 90 TABLET | Refills: 0 | Status: SHIPPED | OUTPATIENT
Start: 2023-12-13 | End: 2023-12-14

## 2023-12-13 RX ORDER — METOPROLOL SUCCINATE 100 MG/1
100 TABLET, EXTENDED RELEASE ORAL DAILY
Qty: 90 TABLET | Refills: 0 | Status: SHIPPED | OUTPATIENT
Start: 2023-12-13 | End: 2023-12-14

## 2023-12-14 ENCOUNTER — OFFICE VISIT (OUTPATIENT)
Dept: FAMILY MEDICINE CLINIC | Facility: CLINIC | Age: 48
End: 2023-12-14
Payer: COMMERCIAL

## 2023-12-14 VITALS
HEIGHT: 66 IN | BODY MASS INDEX: 39.37 KG/M2 | HEART RATE: 72 BPM | RESPIRATION RATE: 16 BRPM | TEMPERATURE: 97 F | SYSTOLIC BLOOD PRESSURE: 152 MMHG | WEIGHT: 245 LBS | DIASTOLIC BLOOD PRESSURE: 92 MMHG

## 2023-12-14 DIAGNOSIS — I10 BENIGN ESSENTIAL HYPERTENSION: Primary | ICD-10-CM

## 2023-12-14 DIAGNOSIS — F32.81 PMDD (PREMENSTRUAL DYSPHORIC DISORDER): ICD-10-CM

## 2023-12-14 DIAGNOSIS — E78.00 HYPERCHOLESTEREMIA: ICD-10-CM

## 2023-12-14 DIAGNOSIS — N95.1 PERIMENOPAUSE: ICD-10-CM

## 2023-12-14 PROCEDURE — 3008F BODY MASS INDEX DOCD: CPT | Performed by: FAMILY MEDICINE

## 2023-12-14 PROCEDURE — 3077F SYST BP >= 140 MM HG: CPT | Performed by: FAMILY MEDICINE

## 2023-12-14 PROCEDURE — 99214 OFFICE O/P EST MOD 30 MIN: CPT | Performed by: FAMILY MEDICINE

## 2023-12-14 PROCEDURE — 3080F DIAST BP >= 90 MM HG: CPT | Performed by: FAMILY MEDICINE

## 2023-12-14 RX ORDER — FLUOXETINE HYDROCHLORIDE 40 MG/1
40 CAPSULE ORAL DAILY
Qty: 90 CAPSULE | Refills: 1 | Status: SHIPPED | OUTPATIENT
Start: 2023-12-14

## 2023-12-14 RX ORDER — VALACYCLOVIR HYDROCHLORIDE 500 MG/1
TABLET, FILM COATED ORAL
COMMUNITY
Start: 2023-11-28

## 2023-12-14 RX ORDER — METOPROLOL SUCCINATE 100 MG/1
100 TABLET, EXTENDED RELEASE ORAL DAILY
Qty: 90 TABLET | Refills: 1 | Status: SHIPPED | OUTPATIENT
Start: 2023-12-14

## 2023-12-14 RX ORDER — FLUOXETINE HYDROCHLORIDE 20 MG/1
20 CAPSULE ORAL DAILY
Qty: 90 CAPSULE | Refills: 1 | Status: CANCELLED | OUTPATIENT
Start: 2023-12-14

## 2023-12-14 RX ORDER — ROSUVASTATIN CALCIUM 20 MG/1
20 TABLET, COATED ORAL NIGHTLY
Qty: 90 TABLET | Refills: 1 | Status: SHIPPED | OUTPATIENT
Start: 2023-12-14

## 2023-12-14 NOTE — PATIENT INSTRUCTIONS
Check your BP twice per day at different times of the day and send or call with readings in about 1 week. Recommend heart scan - $49 scan.

## 2024-01-20 ENCOUNTER — HOSPITAL ENCOUNTER (OUTPATIENT)
Dept: CT IMAGING | Age: 49
Discharge: HOME | End: 2024-01-20
Attending: FAMILY MEDICINE

## 2024-01-20 DIAGNOSIS — Z13.6 SCREENING FOR CARDIOVASCULAR CONDITION: ICD-10-CM

## 2024-01-22 ENCOUNTER — TELEPHONE (OUTPATIENT)
Dept: FAMILY MEDICINE CLINIC | Facility: CLINIC | Age: 49
End: 2024-01-22

## 2024-01-22 NOTE — TELEPHONE ENCOUNTER
Called Pt and she c/o following symptoms:  - intermittent nausea with no vomiting  - intermittent loose and soft stool  - Abdominal dull aching pain/soreness  Pain intensity: Thursday 1/18/24 was 4-5 out of 10, slightly better today down to 1 out of 10 but persistent    Pt denies fever,chills, back pain, chest pain, SOB, burnings sensation.    Pt has not discussed symptoms with Stephanie Dressler PA-C upon LOV: 12/14/23.    Pt states symptoms has been going on for years, was evaluated for gallbladder issues by Dr. Bush in the past but condition was not that bad so she is just taking Omeprazole for it.  Pt states she has symptoms  flare up Thursday 1/18/24 so she is asking to get imaging test ordered.    Routing to Stephanie Dressler PA-C. Please give recommendation. Thank you.

## 2024-01-22 NOTE — TELEPHONE ENCOUNTER
Pt was seen in Dec and states she has been discomfort with gallbladder and would like a referral put in for ultrasound. States she will schedule appt with Dressler once ultrasound is done. Please advise.

## 2024-01-22 NOTE — TELEPHONE ENCOUNTER
Pt could not come in until Thursday morning. An appt was scheduled w/ Dr. Farias for 7:30 am. Pt was instructed if she has any worsening symptoms to go to the ER for eval. Pt. Agreed to plan and verbalized understanding

## 2024-01-25 ENCOUNTER — OFFICE VISIT (OUTPATIENT)
Dept: FAMILY MEDICINE CLINIC | Facility: CLINIC | Age: 49
End: 2024-01-25
Payer: COMMERCIAL

## 2024-01-25 VITALS
TEMPERATURE: 98 F | RESPIRATION RATE: 18 BRPM | DIASTOLIC BLOOD PRESSURE: 74 MMHG | WEIGHT: 243 LBS | SYSTOLIC BLOOD PRESSURE: 132 MMHG | HEART RATE: 68 BPM | HEIGHT: 66 IN | BODY MASS INDEX: 39.05 KG/M2

## 2024-01-25 DIAGNOSIS — R10.11 RIGHT UPPER QUADRANT ABDOMINAL PAIN: Primary | ICD-10-CM

## 2024-01-25 DIAGNOSIS — R19.7 DIARRHEA, UNSPECIFIED TYPE: ICD-10-CM

## 2024-01-25 PROCEDURE — 3078F DIAST BP <80 MM HG: CPT | Performed by: FAMILY MEDICINE

## 2024-01-25 PROCEDURE — 3008F BODY MASS INDEX DOCD: CPT | Performed by: FAMILY MEDICINE

## 2024-01-25 PROCEDURE — 3075F SYST BP GE 130 - 139MM HG: CPT | Performed by: FAMILY MEDICINE

## 2024-01-25 PROCEDURE — 99214 OFFICE O/P EST MOD 30 MIN: CPT | Performed by: FAMILY MEDICINE

## 2024-01-25 RX ORDER — DICYCLOMINE HCL 20 MG
20 TABLET ORAL
Qty: 30 TABLET | Refills: 0 | Status: SHIPPED | OUTPATIENT
Start: 2024-01-25

## 2024-01-25 NOTE — PROGRESS NOTES
[facility] Family Medicine Office Note  Chief Complaint:   Chief Complaint   Patient presents with    Abdominal Pain    Change of Bowel Habits    Nausea    Loose Stools       HPI:   This is a 48 year old female coming in for right upper quadrant abdominal pain the patient states that this has been ongoing for some time.  She does have episodes of diarrhea states that she has been trying to track the foods that cause the symptoms and they are at them some things that she has been trying to stay away from.  States that she has been dealing with this for a while would like to know what the next steps are.      Past Medical History:   Diagnosis Date    Fatigue     HEADACHES     Heartburn     High cholesterol     Sleep disturbance     Weight gain      Past Surgical History:   Procedure Laterality Date    ABLATION  2017    CARPAL TUNNEL RELEASE      HERNIA SURGERY      NEEDLE BIOPSY LEFT  2017    fibrocystic changes    NEEDLE BIOPSY LEFT  2017          x2    ORAL SURGERY PROCEDURE      wisdom teeth    TONSILLECTOMY       Social History:  Social History     Socioeconomic History    Marital status:    Tobacco Use    Smoking status: Never     Passive exposure: Never    Smokeless tobacco: Never   Vaping Use    Vaping Use: Never used   Substance and Sexual Activity    Alcohol use: Yes     Alcohol/week: 0.0 standard drinks of alcohol     Comment: 2-3 times a week    Drug use: No    Sexual activity: Yes     Partners: Male     Birth control/protection: Vasectomy, Surgical   Other Topics Concern    Caffeine Concern Yes     Comment: 1-2 cups daily    Exercise Yes     Comment: walking     Family History:  Family History   Problem Relation Age of Onset    Breast Cancer Paternal Grandmother 63        age at dx 63    Diabetes Mother     Hypertension Mother     Hypertension Father     Heart Attack Father         41-42 years old    Alcohol and Other Disorders Associated Father      Allergies:  Allergies    Allergen Reactions    Hep B [Hepatitis B Virus Vaccine]     Lisinopril OTHER (SEE COMMENTS)     constipation     Current Meds:  Current Outpatient Medications   Medication Sig Dispense Refill    dicyclomine 20 MG Oral Tab Take 1 tablet (20 mg total) by mouth 4 (four) times daily before meals and nightly. 30 tablet 0    metoprolol succinate  MG Oral Tablet 24 Hr Take 1 tablet (100 mg total) by mouth daily. 90 tablet 1    rosuvastatin 20 MG Oral Tab Take 1 tablet (20 mg total) by mouth nightly. 90 tablet 1    FLUoxetine HCl 40 MG Oral Cap Take 1 capsule (40 mg total) by mouth daily. 90 capsule 1    valACYclovir 500 MG Oral Tab TAKE 4 TABLETS AT FIRST SIGN OF ATTACK, THEN TAKE 4 TABLETS 12 HOURS LATER (Patient not taking: Reported on 12/14/2023)        Counseling given: Not Answered       REVIEW OF SYSTEMS:   Consitutional: No fevers, chills, sweats  Eye: No recent visual problems  ENMT: No ear pain nasal congestion sore throat  Respiratory: No shortness of breath, cough  Cardiovascular: No chest pain palpitations syncope  Gastrointestinal: No nausea vomiting positive diarrhea positive abdominal pain  Genitourinary: No hematuria  Hema/Lymph no bruising tendency, swollen lymph glands  Endocrine: Negative for excessive thirst excessive hunger       Medical, surgical, family, and social histories were reviewed      EXAM:   VITALS:   Vitals:    01/25/24 0737   BP: 132/74   Pulse: 68   Resp: 18   Temp: 97.5 °F (36.4 °C)      GENERAL: well developed, well nourished, in no apparent distress  SKIN: no rashes, no suspicious lesions: Cool and Dry  HEENT: atraumatic, normocephalic, ears and throat are clear    Ears: TM's clear and visible bilaterally, no excess cerumen or erythema.   EYES: Pupils equal round and reactive.  Extraocular motions intact no scleral icterus no injection or drainage  THROAT without erythema tonsillar hypertrophy or exudate.  Uvula midline airway patent  NECK: Given midline.  No JVD or  lymphadenopathy supple nontender no meningeal signs   LUNGS: clear to auscultation sounds equal bilaterally no wheezes rales or rhonchi  CARDIO: Regular rate and rhythm without murmurs gallops or rubs  GI: Soft nontender nondistended no hepatomegaly palpable masses.  No guarding.       ASSESSMENT AND PLAN:   1. Right upper quadrant abdominal pain  - US ABDOMEN COMPLETE (CPT=76700); Future  - dicyclomine 20 MG Oral Tab; Take 1 tablet (20 mg total) by mouth 4 (four) times daily before meals and nightly.  Dispense: 30 tablet; Refill: 0  I did discuss with the patient at this time would suggest completing an ultrasound of the abdomen and to discern if there is any other pathological findings that could be causing the discomfort.  Did discuss if there is something with the gallbladder we will likely proceed to HIDA scan.  Did also discuss that this may be IBS-D she was informed that we could try Viberzi in the future.  She was given a prescription for Bentyl to be used as needed  2. Diarrhea, unspecified type       Meds & Refills for this Visit:  Requested Prescriptions     Signed Prescriptions Disp Refills    dicyclomine 20 MG Oral Tab 30 tablet 0     Sig: Take 1 tablet (20 mg total) by mouth 4 (four) times daily before meals and nightly.       Health Maintenance:  Health Maintenance Due   Topic Date Due    DTaP,Tdap,and Td Vaccines (1 - Tdap) Never done    COVID-19 Vaccine (4 - 2023-24 season) 09/01/2023    Influenza Vaccine (1) 10/01/2023    Zoster Vaccines (1 of 2) Never done    Annual Depression Screening  01/01/2024    HTN: BP Follow-Up  01/14/2024    Mammogram  04/14/2024       Patient/Caregiver Education: Patient/Caregiver Education: There are no barriers to learning. Medical education done.   Outcome: Patient verbalizes understanding. Patient is notified to call with any questions, complications, allergies, or worsening or changing symptoms.  Patient is to call with any side effects or complications from the  treatments as a result of today.     Problem List:  Patient Active Problem List   Diagnosis    Chondromalacia patella    Osteoarthritis, knee    Plantar fasciitis of left foot    PMDD (premenstrual dysphoric disorder)    Chronic maxillary sinusitis    Chronic tension-type headache, intractable    Benign essential hypertension    Hypercholesteremia    HSV infection    Dysmenorrhea    At high risk for breast cancer    Perimenopause         No follow-ups on file.     Montrell aFrias MD    Please note that portions of this note may have been completed with a voice recognition program. Efforts were made to edit the dictations but occasionally words are mis-transcribed.

## 2024-02-02 ENCOUNTER — HOSPITAL ENCOUNTER (OUTPATIENT)
Dept: ULTRASOUND IMAGING | Age: 49
Discharge: HOME | End: 2024-02-02
Attending: FAMILY MEDICINE
Payer: COMMERCIAL

## 2024-02-02 DIAGNOSIS — R10.11 RIGHT UPPER QUADRANT ABDOMINAL PAIN: ICD-10-CM

## 2024-02-02 DIAGNOSIS — R10.11 RIGHT UPPER QUADRANT ABDOMINAL PAIN: Primary | ICD-10-CM

## 2024-02-02 PROCEDURE — 76700 US EXAM ABDOM COMPLETE: CPT | Performed by: FAMILY MEDICINE

## 2024-02-07 ENCOUNTER — OFFICE VISIT (OUTPATIENT)
Facility: CLINIC | Age: 49
End: 2024-02-07
Payer: COMMERCIAL

## 2024-02-07 ENCOUNTER — TELEPHONE (OUTPATIENT)
Dept: FAMILY MEDICINE CLINIC | Facility: CLINIC | Age: 49
End: 2024-02-07

## 2024-02-07 VITALS
WEIGHT: 246 LBS | SYSTOLIC BLOOD PRESSURE: 130 MMHG | OXYGEN SATURATION: 96 % | DIASTOLIC BLOOD PRESSURE: 82 MMHG | HEIGHT: 66 IN | RESPIRATION RATE: 16 BRPM | BODY MASS INDEX: 39.53 KG/M2 | HEART RATE: 76 BPM

## 2024-02-07 DIAGNOSIS — R91.8 MULTIPLE LUNG NODULES ON CT: Primary | ICD-10-CM

## 2024-02-07 PROCEDURE — 3079F DIAST BP 80-89 MM HG: CPT | Performed by: INTERNAL MEDICINE

## 2024-02-07 PROCEDURE — 3008F BODY MASS INDEX DOCD: CPT | Performed by: INTERNAL MEDICINE

## 2024-02-07 PROCEDURE — 3075F SYST BP GE 130 - 139MM HG: CPT | Performed by: INTERNAL MEDICINE

## 2024-02-07 PROCEDURE — 99204 OFFICE O/P NEW MOD 45 MIN: CPT | Performed by: INTERNAL MEDICINE

## 2024-02-07 NOTE — H&P
[facility] General Pulmonary Consult Note    History of Present Illness:  Emily Wheeler is a 48 year old female never smoker but significant secondhand smoke exposure with significant PMH of HLD who presents today for evaluation of lung nodules. She reports having a screening calcium scoring CT which incidentally demonstrated lung nodules leading to her evaluation here. She denies acute concerns. No cough, dyspnea, wheeze, chest pain/pressure, f/c/s  Does report hx of bronchitis when younger which resolved post tonsillectomy at age 17  Exposed to second hand smoke significantly when younger from family.  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, +radiation/xray exposure at work and cleaning fumes    Past Medical History:   Past Medical History:   Diagnosis Date    Fatigue     HEADACHES     Heartburn     High cholesterol     Sleep disturbance     Weight gain         Past Surgical History:   Past Surgical History:   Procedure Laterality Date    ABLATION  2017    CARPAL TUNNEL RELEASE      HERNIA SURGERY      NEEDLE BIOPSY LEFT  2017    fibrocystic changes    NEEDLE BIOPSY LEFT  2017          x2    ORAL SURGERY PROCEDURE      wisdom teeth    TONSILLECTOMY         Family Medical History:   Family History   Problem Relation Age of Onset    Breast Cancer Paternal Grandmother 63        age at dx 63    Diabetes Mother     Hypertension Mother     Hypertension Father     Heart Attack Father         41-42 years old    Alcohol and Other Disorders Associated Father         Social History:   Social History     Socioeconomic History    Marital status:      Spouse name: Not on file    Number of children: Not on file    Years of education: Not on file    Highest education level: Not on file   Occupational History    Not on file   Tobacco Use    Smoking status: Never     Passive exposure: Past    Smokeless tobacco: Never   Vaping Use    Vaping Use: Never used   Substance and Sexual  Activity    Alcohol use: Yes     Alcohol/week: 0.0 standard drinks of alcohol     Comment: 2-3 times a week    Drug use: No    Sexual activity: Yes     Partners: Male     Birth control/protection: Vasectomy, Surgical   Other Topics Concern    Caffeine Concern Yes     Comment: 1-2 cups daily    Exercise Yes     Comment: walking    Seat Belt Not Asked    Special Diet Not Asked    Stress Concern Not Asked    Weight Concern Not Asked     Service Not Asked    Blood Transfusions Not Asked    Occupational Exposure Not Asked    Hobby Hazards Not Asked    Sleep Concern Not Asked    Back Care Not Asked    Bike Helmet Not Asked    Self-Exams Not Asked   Social History Narrative    Not on file     Social Determinants of Health     Financial Resource Strain: Not on file   Food Insecurity: Not on file   Transportation Needs: Not on file   Physical Activity: Not on file   Stress: Not on file   Social Connections: Not on file   Housing Stability: Not on file        Allergies: Hep b [hepatitis b virus vaccine] and Lisinopril     Medications:   Current Outpatient Medications   Medication Sig Dispense Refill    dicyclomine 20 MG Oral Tab Take 1 tablet (20 mg total) by mouth 4 (four) times daily before meals and nightly. (Patient taking differently: Take 1 tablet (20 mg total) by mouth 4 (four) times daily before meals and nightly. prn) 30 tablet 0    metoprolol succinate  MG Oral Tablet 24 Hr Take 1 tablet (100 mg total) by mouth daily. 90 tablet 1    rosuvastatin 20 MG Oral Tab Take 1 tablet (20 mg total) by mouth nightly. 90 tablet 1    FLUoxetine HCl 40 MG Oral Cap Take 1 capsule (40 mg total) by mouth daily. 90 capsule 1    valACYclovir 500 MG Oral Tab TAKE 4 TABLETS AT FIRST SIGN OF ATTACK, THEN TAKE 4 TABLETS 12 HOURS LATER (Patient not taking: Reported on 12/14/2023)         Review of Systems: Review of Systems   Constitutional: Negative.    HENT: Negative.     Respiratory: Negative.     Cardiovascular: Negative.     All other systems reviewed and are negative.      Physical Exam:  /82 (BP Location: Left arm, Patient Position: Sitting, Cuff Size: adult)   Pulse 76   Resp 16   Ht 5' 6\" (1.676 m)   Wt 246 lb (111.6 kg)   LMP  (LMP Unknown)   SpO2 96%   BMI 39.71 kg/m²      Constitutional: alert, cooperative. No acute distress.  HEENT: Head NC/AT. Mask in place  Cardio: Regular rate and rhythm. Normal S1 and S2. No murmurs.   Respiratory: Thorax symmetrical with no labored breathing. Clear to ausculation bilaterally with symmetrical breath sounds. No wheezing, rhonchi, rales, or crackles.   GI: NABS. Abd soft, non-tender.  Extremities: No clubbing or cyanosis. No BLE edema.    Neurologic: A&Ox3. No gross motor deficits.  Skin: Warm, dry  Psych: Calm, cooperative. Pleasant affect.    Results:  Personally reviewed  WBC: 8.2, done on 5/25/2023.  HGB: 14.9, done on 5/25/2023.  PLT: 362, done on 5/25/2023.     Glucose: 105, done on 12/13/2023.  Cr: 0.81, done on 12/13/2023.  GFR(AA): 106, done on 4/7/2022.  GFR (non-AA): 92, done on 4/7/2022.  CA: 9, done on 12/13/2023.  Na: 138, done on 12/13/2023.  K: 4.2, done on 12/13/2023.  Cl: 108, done on 12/13/2023.  CO2: 26, done on 12/13/2023.  Last ALB was 3.9% done on 12/13/2023.     US ABDOMEN COMPLETE (CPT=76700)    Result Date: 2/2/2024  CONCLUSION:   No acute cholecystitis, cholelithiasis, or biliary ductal dilatation.  Hepatic steatosis.  The pancreas is poorly visualized secondary to overlying bowel gas.  The patient described tenderness while scanning the pancreatic region.  Consider correlation with lipase to exclude acute pancreatitis.  No hydronephrosis or renal calculus.    Dictated by (CST): Luis Vanegas MD on 2/02/2024 at 10:44 AM     Finalized by (CST): Luis Vanegas MD on 2/02/2024 at 10:49 AM          CT CALCIUM SCORING    Result Date: 1/22/2024  PROCEDURE:  CT CALCIUM SCORING  COMPARISON:  None.  INDICATIONS:  Z13.6 Screening for cardiovascular condition   TECHNIQUE:  The patient was placed in the supine position on the multidetector CT table and high-resolution non-contrast limited CT images of the heart, coronary arteries and proximal great vessels were obtained. Dose reduction techniques were used. Dose  information is transmitted to the ACR (American College of Radiology) NRDR (National Radiology Data Registry) which includes the Dose Index Registry. This cardiac scan was interpreted by a cardiologist with respect to the heart only. Please consult the radiology report for further interpretation   FINDINGS:   REGION  CALCIUM SCORE  LEFT MAIN:  0 LEFT ANTERIOR DESCENDIN CIRCUMFLEX:  0 RIGHT CORONARY ARTERY:    0 TOTAL CALCIUM SCORE:  0     Calcium Score  Implication   0  No identifiable calcified plaque   1-100  Mild atherosclerotic plaque   101-300  Moderate atherosclerotic plaque   301-999  Moderate-severe atherosclerotic plaque   >1000  Severe atherosclerotic plaque  1. J Am Daphne Cardiol Img. 2022 Nov, [phone]  Clinical Relevance of Coronary Artery Scan  This patient identified you as their physician, if this is not correct please contact the Nurse [facility] at [phone] and they will assign this report to another physician.    Dictated by (CST): Mic Morris MD on 2024 at 7:48 AM     Finalized by (CST): Mic Morris MD on 2024 at 7:48 AM       CT CALCIUM SCORING OVER READ    Result Date: 2024  CONCLUSION:  1. There is a 4 mm subpleural left lingular noncalcified lung nodule and a 2 mm noncalcified right middle lobe lung nodule noted.  Follow-up as per Fleischner criteria recommended. The findings include multiple, incidentally detected, solid pulmonary nodules, measuring less than 6mm.  2017 guidelines from the Fleischner Society for the follow-up and management of incidentally detected indeterminate pulmonary nodules in persons at least 35 years of age depend on nodule size (average length and width) and underlying  risk factors (including smoking and other risk factors). Please consider the following recommendations after clinical assessment of risk factors.  For <6mm solid nodules: In low risk patients, no follow-up required.  If suspicious morphology or upper lobe location, consider 12 month follow-up.  In high risk patients, optional CT in 12 months. 2. Old granulomatous disease. 3. Please also refer to the cardiologist's report.       LOCATION:  [place]   Dictated by (CST): Tod Riley MD on 1/20/2024 at 12:08 PM     Finalized by (CST): Tod Riley MD on 1/20/2024 at 12:12 PM         Assessment/Plan:  #1. Lung nodules  Found incidentally on calcium scoring CT  1/2024 calcium scoring scan with bilateral nodules with largest 4mm in MARLON medially as well as another 4mm lingula nodule.  There is a tiny 2mm RML nodule.  Also with calcified mediastinal and hilar nodes as well as calcified LLL nodule  We reviewed her imaging in detail including differential diagnoses and management. She has had significant secondhand smoke exposure as well as chemical/fume and radiation exposure with her work.  First she should obtain a CT chest scan to ensure no other nodules.  If no other findings, then she has an option to repeat a CT chest in one year per fleischner guidelines    Napoleon Huddleston MD  2/7/2024

## 2024-02-07 NOTE — PATIENT INSTRUCTIONS
Obtain a full CT chest scan to check your lungs for any other nodules. Call central scheduling at [phone] to schedule the CT scan.   I will plan to call you after the CT scan is done.  Call or message if you don't hear anything  Call/message with questions/concerns

## 2024-02-07 NOTE — TELEPHONE ENCOUNTER
Pt called insurance to verify if they would cover Zepbound medications. Was advised to have a pre authorization sent over to pharmacy and medication would be covered. Please advise.

## 2024-02-15 ENCOUNTER — OFFICE VISIT (OUTPATIENT)
Dept: FAMILY MEDICINE CLINIC | Facility: CLINIC | Age: 49
End: 2024-02-15
Payer: COMMERCIAL

## 2024-02-15 ENCOUNTER — PATIENT MESSAGE (OUTPATIENT)
Dept: FAMILY MEDICINE CLINIC | Facility: CLINIC | Age: 49
End: 2024-02-15

## 2024-02-15 VITALS
DIASTOLIC BLOOD PRESSURE: 84 MMHG | SYSTOLIC BLOOD PRESSURE: 136 MMHG | HEIGHT: 66 IN | RESPIRATION RATE: 18 BRPM | TEMPERATURE: 97 F | WEIGHT: 246 LBS | HEART RATE: 67 BPM | BODY MASS INDEX: 39.53 KG/M2

## 2024-02-15 DIAGNOSIS — E66.01 MORBID OBESITY DUE TO EXCESS CALORIES (HCC): ICD-10-CM

## 2024-02-15 DIAGNOSIS — Z23 ENCOUNTER FOR IMMUNIZATION: ICD-10-CM

## 2024-02-15 PROCEDURE — 90750 HZV VACC RECOMBINANT IM: CPT | Performed by: FAMILY MEDICINE

## 2024-02-15 PROCEDURE — 3075F SYST BP GE 130 - 139MM HG: CPT | Performed by: FAMILY MEDICINE

## 2024-02-15 PROCEDURE — 3079F DIAST BP 80-89 MM HG: CPT | Performed by: FAMILY MEDICINE

## 2024-02-15 PROCEDURE — 90472 IMMUNIZATION ADMIN EACH ADD: CPT | Performed by: FAMILY MEDICINE

## 2024-02-15 PROCEDURE — 99214 OFFICE O/P EST MOD 30 MIN: CPT | Performed by: FAMILY MEDICINE

## 2024-02-15 PROCEDURE — 90715 TDAP VACCINE 7 YRS/> IM: CPT | Performed by: FAMILY MEDICINE

## 2024-02-15 PROCEDURE — 90471 IMMUNIZATION ADMIN: CPT | Performed by: FAMILY MEDICINE

## 2024-02-15 PROCEDURE — 3008F BODY MASS INDEX DOCD: CPT | Performed by: FAMILY MEDICINE

## 2024-02-15 RX ORDER — TIRZEPATIDE 5 MG/.5ML
5 INJECTION, SOLUTION SUBCUTANEOUS WEEKLY
Qty: 6 ML | Refills: 0 | Status: SHIPPED | OUTPATIENT
Start: 2024-02-15 | End: 2024-02-16

## 2024-02-15 NOTE — TELEPHONE ENCOUNTER
From: Emily Wheeler  To: Montrell Farias  Sent: 2/15/2024 1:12 PM CST  Subject: Zepbound    Hello Doctor. They ended even with coupon not cover a 1 or 3 month of the zepbound. Any other thoughts on how we can get it covered or another medication?? Thank you   Emily Wheeler

## 2024-02-15 NOTE — PROGRESS NOTES
[facility] Family Medicine Office Note  Chief Complaint:   Chief Complaint   Patient presents with    Medication Follow-Up    Medication Question     Pt here to discuss starting Zepbound        HPI:   This is a 48 year old female coming in for follow-up.  The patient states that she would like to start a medication to help with weight loss.  She states that she is motivated to try to modify her diet and increasing her protein intake.  She states that she has spoken to her insurance company and they state they will cover a medication for her.      Past Medical History:   Diagnosis Date    Fatigue     HEADACHES     Heartburn     High cholesterol     Sleep disturbance     Weight gain      Past Surgical History:   Procedure Laterality Date    ABLATION  2017    CARPAL TUNNEL RELEASE      HERNIA SURGERY      NEEDLE BIOPSY LEFT  2017    fibrocystic changes    NEEDLE BIOPSY LEFT  2017          x2    ORAL SURGERY PROCEDURE      wisdom teeth    TONSILLECTOMY       Social History:  Social History     Socioeconomic History    Marital status:    Tobacco Use    Smoking status: Never     Passive exposure: Past    Smokeless tobacco: Never   Vaping Use    Vaping Use: Never used   Substance and Sexual Activity    Alcohol use: Yes     Alcohol/week: 0.0 standard drinks of alcohol     Comment: 2-3 times a week    Drug use: No    Sexual activity: Yes     Partners: Male     Birth control/protection: Vasectomy, Surgical   Other Topics Concern    Caffeine Concern Yes     Comment: 1-2 cups daily    Exercise Yes     Comment: walking     Family History:  Family History   Problem Relation Age of Onset    Breast Cancer Paternal Grandmother 63        age at dx 63    Diabetes Mother     Hypertension Mother     Hypertension Father     Heart Attack Father         41-42 years old    Alcohol and Other Disorders Associated Father      Allergies:  Allergies   Allergen Reactions    Hep B [Hepatitis B Virus Vaccine]      Lisinopril OTHER (SEE COMMENTS)     constipation     Current Meds:  Current Outpatient Medications   Medication Sig Dispense Refill    Tirzepatide-Weight Management (ZEPBOUND) 5 MG/0.5ML Subcutaneous Solution Auto-injector Inject 5 mg into the skin once a week. 6 mL 0    dicyclomine 20 MG Oral Tab Take 1 tablet (20 mg total) by mouth 4 (four) times daily before meals and nightly. (Patient taking differently: Take 1 tablet (20 mg total) by mouth 4 (four) times daily before meals and nightly. prn) 30 tablet 0    metoprolol succinate  MG Oral Tablet 24 Hr Take 1 tablet (100 mg total) by mouth daily. 90 tablet 1    rosuvastatin 20 MG Oral Tab Take 1 tablet (20 mg total) by mouth nightly. 90 tablet 1    FLUoxetine HCl 40 MG Oral Cap Take 1 capsule (40 mg total) by mouth daily. 90 capsule 1    valACYclovir 500 MG Oral Tab TAKE 4 TABLETS AT FIRST SIGN OF ATTACK, THEN TAKE 4 TABLETS 12 HOURS LATER (Patient not taking: Reported on 12/14/2023)        Counseling given: Not Answered       REVIEW OF SYSTEMS:   Consitutional: No fevers, chills, sweats  Eye: No recent visual problems  ENMT: No ear pain nasal congestion sore throat  Respiratory: No shortness of breath, cough  Cardiovascular: No chest pain palpitations syncope  Gastrointestinal: No nausea vomiting diarrhea  Genitourinary: No hematuria  Hema/Lymph no bruising tendency, swollen lymph glands  Endocrine: Negative for excessive thirst excessive hunger       Medical, surgical, family, and social histories were reviewed      EXAM:   VITALS:   Vitals:    02/15/24 1057   BP: 136/84   Pulse:    Resp:    Temp:       GENERAL: well developed, well nourished, in no apparent distress  SKIN: no rashes, no suspicious lesions: Cool and Dry  HEENT: atraumatic, normocephalic, ears and throat are clear    Ears: TM's clear and visible bilaterally, no excess cerumen or erythema.   EYES: Pupils equal round and reactive.  Extraocular motions intact no scleral icterus no injection or  drainage  THROAT without erythema tonsillar hypertrophy or exudate.  Uvula midline airway patent  NECK: Given midline.  No JVD or lymphadenopathy supple nontender no meningeal signs   LUNGS: clear to auscultation sounds equal bilaterally no wheezes rales or rhonchi  CARDIO: Regular rate and rhythm without murmurs gallops or rubs       ASSESSMENT AND PLAN:   1. Morbid obesity due to excess calories (HCC)  - Tirzepatide-Weight Management (ZEPBOUND) 5 MG/0.5ML Subcutaneous Solution Auto-injector; Inject 5 mg into the skin once a week.  Dispense: 6 mL; Refill: 0  I did discuss with the patient that we could try zepbound she was asked to use 5 mg once a week.  We did discuss increasing her protein intake to 150 to 200 g a day.  We did also discussed decreasing portion size as well as increasing the frequency of her meals.  I did discuss with her she needs to watch for early satiety with this medication if she pushes past this point she would get nausea vomiting constipation abdominal cramping.  She was asked to follow-up in the next 1 month to assess for accountability for weight loss as well as to discuss her diet regimen.  She was asked to contact us if she has any issues with the medication.  2. Encounter for immunization  - TETANUS, DIPHTHERIA TOXOIDS AND ACELLULAR PERTUSIS VACCINE (TDAP), >7 YEARS, IM USE  - ZOSTER VACC RECOMBINANT IM NJX  - ZOSTER VACC RECOMBINANT IM NJX; Future  I did discuss with the patient I would suggest updating her immunizations today she received a tetanus vaccine as well as the Shingrix vaccine.  Was asked to follow-up in the next 2 months to complete the series for Shingrix.    Meds & Refills for this Visit:  Requested Prescriptions     Signed Prescriptions Disp Refills    Tirzepatide-Weight Management (ZEPBOUND) 5 MG/0.5ML Subcutaneous Solution Auto-injector 6 mL 0     Sig: Inject 5 mg into the skin once a week.       Health Maintenance:  Health Maintenance Due   Topic Date Due     COVID-19 Vaccine (4 - 2023-24 season) 09/01/2023    Influenza Vaccine (1) 10/01/2023    Mammogram  04/14/2024       Patient/Caregiver Education: Patient/Caregiver Education: There are no barriers to learning. Medical education done.   Outcome: Patient verbalizes understanding. Patient is notified to call with any questions, complications, allergies, or worsening or changing symptoms.  Patient is to call with any side effects or complications from the treatments as a result of today.     Problem List:  Patient Active Problem List   Diagnosis    Chondromalacia patella    Osteoarthritis, knee    Plantar fasciitis of left foot    PMDD (premenstrual dysphoric disorder)    Chronic maxillary sinusitis    Chronic tension-type headache, intractable    Benign essential hypertension    Hypercholesteremia    HSV infection    Dysmenorrhea    At high risk for breast cancer    Perimenopause         No follow-ups on file.     Montrell Farias MD    Please note that portions of this note may have been completed with a voice recognition program. Efforts were made to edit the dictations but occasionally words are mis-transcribed.

## 2024-02-16 ENCOUNTER — TELEPHONE (OUTPATIENT)
Dept: FAMILY MEDICINE CLINIC | Facility: CLINIC | Age: 49
End: 2024-02-16

## 2024-03-07 ENCOUNTER — TELEPHONE (OUTPATIENT)
Dept: FAMILY MEDICINE CLINIC | Facility: CLINIC | Age: 49
End: 2024-03-07

## 2024-03-11 ENCOUNTER — TELEPHONE (OUTPATIENT)
Facility: CLINIC | Age: 49
End: 2024-03-11

## 2024-03-14 ENCOUNTER — PATIENT MESSAGE (OUTPATIENT)
Dept: FAMILY MEDICINE CLINIC | Facility: CLINIC | Age: 49
End: 2024-03-14

## 2024-03-14 NOTE — TELEPHONE ENCOUNTER
From: Emily Wheeler  To: Montrell Farias  Sent: 3/14/2024 11:43 AM CDT  Subject: Wegovy     Good morning. I am following up with the RX Deirdre sent in for me. Talked to my insurance company. They have not covered wegovy but would like the office to send in my labs from December, information about Dr and mine appt and any information that will help them cover it. Including my medication list. Thank you again   Emily

## 2024-04-30 NOTE — TELEPHONE ENCOUNTER
Last office visit:  12/14/2023  Last Refill:  12/14/2023  Return To Clinic: 6 months from last office visit   Protocol:          Medication Quantity Refills Start End   FLUoxetine HCl 40 MG Oral Cap 90 capsule 1 12/14/2023 --   Sig:   Take 1 capsule (40 mg total) by mouth daily.

## 2024-05-02 ENCOUNTER — ORDER TRANSCRIPTION (OUTPATIENT)
Dept: ADMINISTRATIVE | Facility: HOSPITAL | Age: 49
End: 2024-05-02

## 2024-05-02 DIAGNOSIS — Z12.31 ENCOUNTER FOR SCREENING MAMMOGRAM FOR MALIGNANT NEOPLASM OF BREAST: Primary | ICD-10-CM

## 2024-05-02 RX ORDER — FLUOXETINE HYDROCHLORIDE 40 MG/1
40 CAPSULE ORAL DAILY
Qty: 90 CAPSULE | Refills: 0 | Status: SHIPPED | OUTPATIENT
Start: 2024-05-02

## 2024-05-23 ENCOUNTER — HOSPITAL ENCOUNTER (OUTPATIENT)
Dept: MAMMOGRAPHY | Age: 49
Discharge: HOME | End: 2024-05-23
Attending: OBSTETRICS & GYNECOLOGY

## 2024-05-23 DIAGNOSIS — Z12.31 ENCOUNTER FOR SCREENING MAMMOGRAM FOR MALIGNANT NEOPLASM OF BREAST: ICD-10-CM

## 2024-05-23 PROCEDURE — 77067 SCR MAMMO BI INCL CAD: CPT | Performed by: OBSTETRICS & GYNECOLOGY

## 2024-05-23 PROCEDURE — 77063 BREAST TOMOSYNTHESIS BI: CPT | Performed by: OBSTETRICS & GYNECOLOGY

## 2024-05-26 DIAGNOSIS — Z00.00 LABORATORY EXAMINATION ORDERED AS PART OF A ROUTINE GENERAL MEDICAL EXAMINATION: Primary | ICD-10-CM

## 2024-05-28 NOTE — TELEPHONE ENCOUNTER
Last office visit: 12/14/2023  Last Refill: 5/2/2024  Return To Clinic: 6 months per last office visit  Protocol: Please refill if appropriate           Medication Quantity Refills Start End   FLUoxetine HCl 40 MG Oral Cap 90 capsule 0 5/2/2024 --   Sig:   Take 1 capsule (40 mg total) by mouth daily.     Route:   Oral

## 2024-05-29 NOTE — TELEPHONE ENCOUNTER
Reached out to patient in regards to her medication dosage, left message for patient to call clinic back. Will send a my chart message.

## 2024-05-30 NOTE — TELEPHONE ENCOUNTER
Pt called back  Confirms she is on 40mg.  Rx refused.  She is asking if labs are in system for appt she has in June?    Per chart has px scheduled.      I pended labs.    Please review/ approve if appropriate. Thanks.

## 2024-06-07 ENCOUNTER — HOSPITAL ENCOUNTER (OUTPATIENT)
Dept: MAMMOGRAPHY | Age: 49
Discharge: HOME | End: 2024-06-07
Attending: OBSTETRICS & GYNECOLOGY
Payer: COMMERCIAL

## 2024-06-07 DIAGNOSIS — R92.2 INCONCLUSIVE MAMMOGRAM: ICD-10-CM

## 2024-06-07 PROCEDURE — 77061 BREAST TOMOSYNTHESIS UNI: CPT | Performed by: OBSTETRICS & GYNECOLOGY

## 2024-06-07 PROCEDURE — 77065 DX MAMMO INCL CAD UNI: CPT | Performed by: OBSTETRICS & GYNECOLOGY

## 2024-06-19 ENCOUNTER — LAB ENCOUNTER (OUTPATIENT)
Dept: LAB | Age: 49
End: 2024-06-19
Attending: FAMILY MEDICINE

## 2024-06-19 DIAGNOSIS — Z00.00 LABORATORY EXAMINATION ORDERED AS PART OF A ROUTINE GENERAL MEDICAL EXAMINATION: ICD-10-CM

## 2024-06-19 DIAGNOSIS — R73.09 ELEVATED GLUCOSE: ICD-10-CM

## 2024-06-19 DIAGNOSIS — R10.11 RIGHT UPPER QUADRANT ABDOMINAL PAIN: ICD-10-CM

## 2024-06-19 LAB
ALBUMIN SERPL-MCNC: 3.9 G/DL (ref 3.4–5)
ALBUMIN/GLOB SERPL: 1 {RATIO} (ref 1–2)
ALP LIVER SERPL-CCNC: 66 U/L
ALT SERPL-CCNC: 31 U/L
AMYLASE SERPL-CCNC: 34 U/L (ref 25–115)
ANION GAP SERPL CALC-SCNC: 7 MMOL/L (ref 0–18)
AST SERPL-CCNC: 21 U/L (ref 15–37)
BASOPHILS # BLD AUTO: 0.08 X10(3) UL (ref 0–0.2)
BASOPHILS NFR BLD AUTO: 1 %
BILIRUB SERPL-MCNC: 0.6 MG/DL (ref 0.1–2)
BILIRUB UR QL STRIP.AUTO: NEGATIVE
BUN BLD-MCNC: 10 MG/DL (ref 9–23)
CALCIUM BLD-MCNC: 8.9 MG/DL (ref 8.5–10.1)
CHLORIDE SERPL-SCNC: 105 MMOL/L (ref 98–112)
CHOLEST SERPL-MCNC: 178 MG/DL (ref ?–200)
CLARITY UR REFRACT.AUTO: CLEAR
CO2 SERPL-SCNC: 26 MMOL/L (ref 21–32)
COLOR UR AUTO: YELLOW
CREAT BLD-MCNC: 0.8 MG/DL
EGFRCR SERPLBLD CKD-EPI 2021: 90 ML/MIN/1.73M2 (ref 60–?)
EOSINOPHIL # BLD AUTO: 0.38 X10(3) UL (ref 0–0.7)
EOSINOPHIL NFR BLD AUTO: 4.6 %
ERYTHROCYTE [DISTWIDTH] IN BLOOD BY AUTOMATED COUNT: 13.4 %
FASTING PATIENT LIPID ANSWER: YES
FASTING STATUS PATIENT QL REPORTED: YES
GLOBULIN PLAS-MCNC: 4.1 G/DL (ref 2.8–4.4)
GLUCOSE BLD-MCNC: 104 MG/DL (ref 70–99)
GLUCOSE UR STRIP.AUTO-MCNC: NORMAL MG/DL
HCT VFR BLD AUTO: 43 %
HDLC SERPL-MCNC: 53 MG/DL (ref 40–59)
HGB BLD-MCNC: 14.7 G/DL
IMM GRANULOCYTES # BLD AUTO: 0.02 X10(3) UL (ref 0–1)
IMM GRANULOCYTES NFR BLD: 0.2 %
KETONES UR STRIP.AUTO-MCNC: NEGATIVE MG/DL
LDLC SERPL CALC-MCNC: 102 MG/DL (ref ?–100)
LEUKOCYTE ESTERASE UR QL STRIP.AUTO: NEGATIVE
LIPASE SERPL-CCNC: 52 U/L (ref ?–300)
LYMPHOCYTES # BLD AUTO: 2.18 X10(3) UL (ref 1–4)
LYMPHOCYTES NFR BLD AUTO: 26.3 %
MCH RBC QN AUTO: 31.1 PG (ref 26–34)
MCHC RBC AUTO-ENTMCNC: 34.2 G/DL (ref 31–37)
MCV RBC AUTO: 90.9 FL
MONOCYTES # BLD AUTO: 0.48 X10(3) UL (ref 0.1–1)
MONOCYTES NFR BLD AUTO: 5.8 %
NEUTROPHILS # BLD AUTO: 5.15 X10 (3) UL (ref 1.5–7.7)
NEUTROPHILS # BLD AUTO: 5.15 X10(3) UL (ref 1.5–7.7)
NEUTROPHILS NFR BLD AUTO: 62.1 %
NITRITE UR QL STRIP.AUTO: NEGATIVE
NONHDLC SERPL-MCNC: 125 MG/DL (ref ?–130)
OSMOLALITY SERPL CALC.SUM OF ELEC: 285 MOSM/KG (ref 275–295)
PH UR STRIP.AUTO: 6.5 [PH] (ref 5–8)
PLATELET # BLD AUTO: 323 10(3)UL (ref 150–450)
POTASSIUM SERPL-SCNC: 4.2 MMOL/L (ref 3.5–5.1)
PROT SERPL-MCNC: 8 G/DL (ref 6.4–8.2)
PROT UR STRIP.AUTO-MCNC: NEGATIVE MG/DL
RBC # BLD AUTO: 4.73 X10(6)UL
RBC UR QL AUTO: NEGATIVE
SODIUM SERPL-SCNC: 138 MMOL/L (ref 136–145)
SP GR UR STRIP.AUTO: 1.02 (ref 1–1.03)
TRIGL SERPL-MCNC: 130 MG/DL (ref 30–149)
TSI SER-ACNC: 1.45 MIU/ML (ref 0.36–3.74)
UROBILINOGEN UR STRIP.AUTO-MCNC: NORMAL MG/DL
VLDLC SERPL CALC-MCNC: 22 MG/DL (ref 0–30)
WBC # BLD AUTO: 8.3 X10(3) UL (ref 4–11)

## 2024-06-19 PROCEDURE — 80061 LIPID PANEL: CPT | Performed by: FAMILY MEDICINE

## 2024-06-19 PROCEDURE — 82150 ASSAY OF AMYLASE: CPT | Performed by: FAMILY MEDICINE

## 2024-06-19 PROCEDURE — 83690 ASSAY OF LIPASE: CPT | Performed by: FAMILY MEDICINE

## 2024-06-19 PROCEDURE — 80050 GENERAL HEALTH PANEL: CPT | Performed by: FAMILY MEDICINE

## 2024-06-19 PROCEDURE — 83036 HEMOGLOBIN GLYCOSYLATED A1C: CPT | Performed by: FAMILY MEDICINE

## 2024-06-19 PROCEDURE — 81003 URINALYSIS AUTO W/O SCOPE: CPT | Performed by: FAMILY MEDICINE

## 2024-06-20 ENCOUNTER — OFFICE VISIT (OUTPATIENT)
Dept: FAMILY MEDICINE CLINIC | Facility: CLINIC | Age: 49
End: 2024-06-20

## 2024-06-20 ENCOUNTER — TELEPHONE (OUTPATIENT)
Dept: FAMILY MEDICINE CLINIC | Facility: CLINIC | Age: 49
End: 2024-06-20

## 2024-06-20 VITALS
DIASTOLIC BLOOD PRESSURE: 78 MMHG | SYSTOLIC BLOOD PRESSURE: 126 MMHG | WEIGHT: 241.38 LBS | TEMPERATURE: 97 F | RESPIRATION RATE: 20 BRPM | BODY MASS INDEX: 38.79 KG/M2 | HEIGHT: 66 IN | HEART RATE: 64 BPM

## 2024-06-20 DIAGNOSIS — E78.00 HYPERCHOLESTEREMIA: ICD-10-CM

## 2024-06-20 DIAGNOSIS — Z13.89 SCREENING FOR GENITOURINARY CONDITION: ICD-10-CM

## 2024-06-20 DIAGNOSIS — Z00.00 WELLNESS EXAMINATION: Primary | ICD-10-CM

## 2024-06-20 DIAGNOSIS — Z00.00 LABORATORY EXAMINATION ORDERED AS PART OF A ROUTINE GENERAL MEDICAL EXAMINATION: ICD-10-CM

## 2024-06-20 DIAGNOSIS — R73.09 ELEVATED GLUCOSE: Primary | ICD-10-CM

## 2024-06-20 LAB
EST. AVERAGE GLUCOSE BLD GHB EST-MCNC: 114 MG/DL (ref 68–126)
HBA1C MFR BLD: 5.6 % (ref ?–5.7)

## 2024-06-20 PROCEDURE — 3008F BODY MASS INDEX DOCD: CPT | Performed by: FAMILY MEDICINE

## 2024-06-20 PROCEDURE — 3078F DIAST BP <80 MM HG: CPT | Performed by: FAMILY MEDICINE

## 2024-06-20 PROCEDURE — 99396 PREV VISIT EST AGE 40-64: CPT | Performed by: FAMILY MEDICINE

## 2024-06-20 PROCEDURE — 3074F SYST BP LT 130 MM HG: CPT | Performed by: FAMILY MEDICINE

## 2024-06-20 RX ORDER — FLUOXETINE HYDROCHLORIDE 20 MG/1
20 CAPSULE ORAL DAILY
COMMUNITY
Start: 2024-02-28 | End: 2024-06-20 | Stop reason: ALTCHOICE

## 2024-06-20 RX ORDER — FLUOXETINE HYDROCHLORIDE 40 MG/1
40 CAPSULE ORAL DAILY
Qty: 90 CAPSULE | Refills: 1 | Status: SHIPPED | OUTPATIENT
Start: 2024-06-20

## 2024-06-20 RX ORDER — VALACYCLOVIR HYDROCHLORIDE 500 MG/1
TABLET, FILM COATED ORAL
Qty: 8 TABLET | Refills: 11 | Status: SHIPPED | OUTPATIENT
Start: 2024-06-20

## 2024-06-20 RX ORDER — ROSUVASTATIN CALCIUM 20 MG/1
20 TABLET, COATED ORAL NIGHTLY
Qty: 90 TABLET | Refills: 1 | Status: SHIPPED | OUTPATIENT
Start: 2024-06-20

## 2024-06-20 RX ORDER — METOPROLOL SUCCINATE 100 MG/1
100 TABLET, EXTENDED RELEASE ORAL DAILY
Qty: 90 TABLET | Refills: 1 | Status: SHIPPED | OUTPATIENT
Start: 2024-06-20

## 2024-06-20 NOTE — PROGRESS NOTES
CHIEF COMPLAINT       Annual Physical Exam  HPI:   Emily Wheeler is a 48 year old female who presents for a complete physical exam.   Dr. Talbot - [facility].  Exam just done.        Wt Readings from Last 6 Encounters:   06/20/24 241 lb 6.4 oz (109.5 kg)   02/15/24 246 lb (111.6 kg)   02/07/24 246 lb (111.6 kg)   01/25/24 243 lb (110.2 kg)   12/14/23 245 lb (111.1 kg)   10/26/23 245 lb (111.1 kg)     Body mass index is 38.96 kg/m².     Cholesterol, Total (mg/dL)   Date Value   06/19/2024 178   12/13/2023 193   05/25/2023 187   12/10/2014 249 (H)   10/11/2011 190     Cholesterol (mg/dL)   Date Value   03/17/2017 298 (H)     HDL Cholesterol (mg/dL)   Date Value   06/19/2024 53   12/13/2023 51   05/25/2023 51   12/10/2014 73   10/11/2011 59     Direct HDL (mg/dL)   Date Value   03/17/2017 63     LDL Cholesterol Calc (mg/dL)   Date Value   12/10/2014 157 (H)   10/11/2011 114 (H)     LDL Cholesterol (mg/dL)   Date Value   06/19/2024 102 (H)   12/13/2023 117 (H)   05/25/2023 110 (H)     Calculated LDL (mg/dL)   Date Value   03/17/2017 209 (H)     Triglycerides (mg/dL)   Date Value   12/10/2014 94   10/11/2011 87     AST (U/L)   Date Value   06/19/2024 21   12/13/2023 23   05/25/2023 23     ALT (U/L)   Date Value   06/19/2024 31   12/13/2023 38   05/25/2023 37        Current Outpatient Medications   Medication Sig Dispense Refill    FLUoxetine HCl 40 MG Oral Cap Take 1 capsule (40 mg total) by mouth daily. 90 capsule 0    metoprolol succinate  MG Oral Tablet 24 Hr Take 1 tablet (100 mg total) by mouth daily. 90 tablet 1    rosuvastatin 20 MG Oral Tab Take 1 tablet (20 mg total) by mouth nightly. 90 tablet 1    dicyclomine 20 MG Oral Tab Take 1 tablet (20 mg total) by mouth 4 (four) times daily before meals and nightly. (Patient not taking: Reported on 6/20/2024) 30 tablet 0    valACYclovir 500 MG Oral Tab TAKE 4 TABLETS AT FIRST SIGN OF ATTACK, THEN TAKE 4 TABLETS 12 HOURS LATER (Patient not taking: Reported on  2023)        Allergies   Allergen Reactions    Hep B [Hepatitis B Virus Vaccine]     Lisinopril OTHER (SEE COMMENTS)     constipation      Past Medical History:    Fatigue    HEADACHES    Heartburn    High cholesterol    Sleep disturbance    Weight gain      Past Surgical History:   Procedure Laterality Date    Ablation  2017    Carpal tunnel release      Hernia surgery      Needle biopsy left  2017    fibrocystic changes    Needle biopsy left  2017          x2    Oral surgery procedure      wisdom teeth    Tonsillectomy  1992      Family History   Problem Relation Age of Onset    Breast Cancer Paternal Grandmother 63        age at dx 63    Diabetes Mother     Hypertension Mother     Hypertension Father     Heart Attack Father         41-42 years old    Alcohol and Other Disorders Associated Father       Social History:   Social History     Socioeconomic History    Marital status:    Tobacco Use    Smoking status: Never     Passive exposure: Past    Smokeless tobacco: Never   Vaping Use    Vaping status: Never Used   Substance and Sexual Activity    Alcohol use: Yes     Alcohol/week: 0.0 standard drinks of alcohol     Comment: 2-3 times a week    Drug use: No    Sexual activity: Yes     Partners: Male     Birth control/protection: Vasectomy, Surgical   Other Topics Concern    Caffeine Concern Yes     Comment: 1-2 cups daily    Exercise Yes     Comment: walking          REVIEW OF SYSTEMS:   GENERAL: feels well otherwise  SKIN: no complaint of any unusual skin lesions  EYES: no complaint of blurred vision or double vision  HEENT: no complaint of nasal congestion, sinus pain or ST  LUNGS: no complaint of shortness of breath with exertion  CARDIOVASCULAR: no complaint of chest pain on exertion  GI: no complaint of pain,denies heartburn  : No complains of dysuria or vaginal discharge  MUSCULOSKELETAL: no complaint of back pain  NEURO: no complaint of headaches  PSYCHE: no complaint  ofdepression or anxiety    EXAM:   /78   Pulse 64   Temp 97.2 °F (36.2 °C) (Temporal)   Resp 20   Ht 5' 6\" (1.676 m)   Wt 241 lb 6.4 oz (109.5 kg)   BMI 38.96 kg/m²   Body mass index is 38.96 kg/m².   GENERAL: well developed, well nourished,in no apparent distress  SKIN: no rashes,no suspicious lesions  HEENT: atraumatic, normocephalic,ears and throat are clear  EYES:PERRLA, EOMI, conjunctiva are clear  NECK: supple,no adenopathy,no bruits  BREAST: no dominant or suspicious mass, no nipple discharge  LUNGS: clear to auscultation  CARDIO: RRR without murmur  GI: good BS's,no masses, HSM or tenderness  :deferred - done at [facility].   MUSCULOSKELETAL: No obvious joint swelling or deformity.   EXTREMITIES: no cyanosis, clubbing or edema  NEURO: Oriented times three,cranial nerves are intact,motor and sensory are grossly intact    ASSESSMENT AND PLAN:   Emily Wheeler is a 48 year old female who presents for a complete physical exam.   Pt' s weight is Body mass index is 38.96 kg/m².. Recommend regular exercise. The patient indicates understanding of these issues and agrees to the plan.   Encounter Diagnoses   Name Primary?    Wellness examination Yes    Screening for genitourinary condition     Laboratory examination ordered as part of a routine general medical examination     Hypercholesteremia        No orders of the defined types were placed in this encounter.      Meds & Refills for this Visit:  Requested Prescriptions     Signed Prescriptions Disp Refills    FLUoxetine HCl 40 MG Oral Cap 90 capsule 1     Sig: Take 1 capsule (40 mg total) by mouth daily.    metoprolol succinate  MG Oral Tablet 24 Hr 90 tablet 1     Sig: Take 1 tablet (100 mg total) by mouth daily.    rosuvastatin 20 MG Oral Tab 90 tablet 1     Sig: Take 1 tablet (20 mg total) by mouth nightly.    valACYclovir 500 MG Oral Tab 8 tablet 11     Si po BID x 1 day.       Follow up 6 months and prn

## 2024-06-20 NOTE — TELEPHONE ENCOUNTER
Patient calling to report that she got her lab work completed and is asking for it to be sent to insurance again to see if she qualifies for wegovy or ozempic.

## 2024-06-21 ENCOUNTER — TELEPHONE (OUTPATIENT)
Dept: FAMILY MEDICINE CLINIC | Facility: CLINIC | Age: 49
End: 2024-06-21

## 2024-06-21 NOTE — TELEPHONE ENCOUNTER
PA started and sent to the patient insurance for   semaglutide-weight management 0.25 MG/0.5ML Subcutaneous Solution Auto-injector.    Will await for decision.

## 2024-10-09 ENCOUNTER — OFFICE VISIT (OUTPATIENT)
Facility: LOCATION | Age: 49
End: 2024-10-09
Payer: COMMERCIAL

## 2024-10-09 DIAGNOSIS — J32.4 CHRONIC PANSINUSITIS: Primary | ICD-10-CM

## 2024-10-09 PROCEDURE — 99213 OFFICE O/P EST LOW 20 MIN: CPT | Performed by: OTOLARYNGOLOGY

## 2024-10-09 RX ORDER — PREDNISONE 10 MG/1
10 TABLET ORAL DAILY
Qty: 14 TABLET | Refills: 0 | Status: SHIPPED | OUTPATIENT
Start: 2024-10-09

## 2024-10-09 RX ORDER — CEFUROXIME AXETIL 250 MG/1
250 TABLET ORAL 2 TIMES DAILY
Qty: 30 TABLET | Refills: 0 | Status: SHIPPED | OUTPATIENT
Start: 2024-10-09

## 2024-10-09 RX ORDER — PREDNISONE 10 MG/1
5 TABLET ORAL DAILY
Qty: 14 TABLET | Refills: 0 | Status: SHIPPED | OUTPATIENT
Start: 2024-10-09 | End: 2024-10-09

## 2024-10-09 NOTE — PROGRESS NOTES
Emily Wheeler is a 48 year old female.   Chief Complaint   Patient presents with    Sinus Problem     HPI:   She had a balloon sinuplasty by myself back in 2017.  Send it is septoplasty in December 2022.  Over the last 2 months she has had headache pain and pressure or congestion discharge on the left side.  She also has a history of allergies.    REVIEW OF SYSTEMS:   GENERAL HEALTH: feels well otherwise  GENERAL : denies fever, chills, sweats, weight loss, weight gain  SKIN: denies any unusual skin lesions or rashes  RESPIRATORY: denies shortness of breath with exertion  NEURO: denies headaches    EXAM:   There were no vitals taken for this visit.    System Findings Details   Constitutional  Overall appearance - Normal.   Psychiatric  Orientation - Oriented to time, place, person & situation. Appropriate mood and affect.   Head/Face  Facial features -- Normal. Skull - Normal.   Eyes  Pupils equal ,round ,react to light and accomidate   Ears, Nose, Throat, Neck  Ears clear nose erythemic congestion with what appears to be a small polyp at the middle meatus on the left.  Oropharynx cobblestoning   Neurological  Memory - Normal. Cranial nerves - Cranial nerves II through XII grossly intact.   Lymph Detail  Submental. Submandibular. Anterior cervical. Posterior cervical. Supraclavicular.       ASSESSMENT AND PLAN:   1. Chronic pansinusitis  She is status post balloon sinuplasty in 2017 and septoplasty in 2022.  I am concerned there is a polyp at the left middle meatus.  She is leaving for a trip to [place].  Airplane information sheet was given.  She will start on Ceftin and prednisone.  If she is not better she will call back and I will do a dedicated CT sinus.      The patient indicates understanding of these issues and agrees to the plan.    No follow-ups on file.    Parish Webber MD  10/9/2024  12:52 PM

## 2024-10-28 ENCOUNTER — TELEPHONE (OUTPATIENT)
Facility: LOCATION | Age: 49
End: 2024-10-28

## 2024-11-04 NOTE — TELEPHONE ENCOUNTER
Requested Prescriptions     Pending Prescriptions Disp Refills    PREDNISONE 10 MG Oral Tab [Pharmacy Med Name: PREDNISONE 10 MG TABLET] 14 tablet 0     Sig: TAKE 1/2 TABLET BY MOUTH DAILY       FILLED- 10/9/24  LOV- 10/9/24    Future Appointments   Date Time Provider Department Center   11/7/2024  3:40 PM Parish Webber MD [id number]

## 2024-11-05 RX ORDER — PREDNISONE 10 MG/1
5 TABLET ORAL DAILY
Qty: 14 TABLET | Refills: 0 | Status: SHIPPED | OUTPATIENT
Start: 2024-11-05

## 2024-11-06 ENCOUNTER — TELEPHONE (OUTPATIENT)
Facility: LOCATION | Age: 49
End: 2024-11-06

## 2024-11-06 NOTE — TELEPHONE ENCOUNTER
Spoke with Luly LEW at [facility] over the phone. No pre-certification required for CPT 73286.     Ref #: [id number]

## 2024-11-07 ENCOUNTER — OFFICE VISIT (OUTPATIENT)
Facility: LOCATION | Age: 49
End: 2024-11-07
Payer: COMMERCIAL

## 2024-11-07 DIAGNOSIS — J32.4 CHRONIC PANSINUSITIS: Primary | ICD-10-CM

## 2024-11-07 PROCEDURE — 77011 CT SCAN FOR LOCALIZATION: CPT | Performed by: OTOLARYNGOLOGY

## 2024-11-07 PROCEDURE — 99214 OFFICE O/P EST MOD 30 MIN: CPT | Performed by: OTOLARYNGOLOGY

## 2024-11-07 RX ORDER — FLUTICASONE PROPIONATE 93 UG/1
1 SPRAY, METERED NASAL 2 TIMES DAILY
Qty: 1 EACH | Refills: 11 | Status: SHIPPED | OUTPATIENT
Start: 2024-11-07

## 2024-11-07 RX ORDER — PREDNISONE 5 MG/1
5 TABLET ORAL DAILY
Qty: 7 TABLET | Refills: 0 | Status: SHIPPED | OUTPATIENT
Start: 2024-11-07

## 2024-11-08 NOTE — PROGRESS NOTES
Emily Wheeler is a 48 year old female. No chief complaint on file.    HPI:   She is status post balloon sinuplasty in 2017.  Unfortunately she is once again gotten recurrent sinusitis.  She gets headache congestion and drainage.  She was recent on antibiotics and prednisone only to have return of symptoms.  She has had chronic problems for years.    REVIEW OF SYSTEMS:   GENERAL HEALTH: feels well otherwise  GENERAL : denies fever, chills, sweats, weight loss, weight gain  SKIN: denies any unusual skin lesions or rashes  RESPIRATORY: denies shortness of breath with exertion  NEURO: denies headaches    EXAM:   There were no vitals taken for this visit.    System Findings Details   Constitutional  Overall appearance - Normal.   Psychiatric  Orientation - Oriented to time, place, person & situation. Appropriate mood and affect.   Head/Face  Facial features -- Normal. Skull - Normal.   Eyes  Pupils equal ,round ,react to light and accomidate   Ears, Nose, Throat, Neck  Ears clear no fluid nose erythemic congestion oral cavity clear oropharynx erythema and cobblestoning there is polypoid changes of the left middle meatus.  Neck no masses   Neurological  Memory - Normal. Cranial nerves - Cranial nerves II through XII grossly intact.   Lymph Detail  Submental. Submandibular. Anterior cervical. Posterior cervical. Supraclavicular.   Clinical indication: Chronic sinusitis    Exam title: CT guidance stereotactic localization of sinuses    Technique: ASR on mini CAT was used with a sinus CT protocol.  The patient was positioned seated upright with the head aligned and supported with malar retention.  A  film was used to ensure proper targeting.  Volume CT data was acquired.  Multiplanar reconstruction was performed on a viewing work stand to obtain axial and coronal images.  Images were manipulated to adjust contrast for bone window viewing.    Scan time: 20 seconds    Peak voltage: 120 K     Tube current: 48.30  MAS    Comparison to prior CT scans: None    Findings:  Frontal sinus are clear.  There is mucosal thickening and ostiomeatal unit obstruction left greater than right.  There is an air-fluid level in the left maxillary sinus.  There is mild mucosal thickening in the ethmoid sinuses.  The sphenoid sinus are clear.    Diagnosis:  Mucosal thickening and changes of chronic sinusitis worse on the left-hand side with ostiomeatal unit obstruction along with an air-fluid level within the left maxillary sinus.    ASSESSMENT AND PLAN:   1. Chronic pansinusitis  She has had chronic problems for years.  CT of the sinus shows persistent problems at the ostiomeatal unit bilaterally worse on the left and examination reveals polypoid changes.  This is not true polyps.  This more swelling of the lining.  Given the chronic complaints and numerous rounds of antibiotics and steroids is reasonable to proceed with a sinus procedure.The risk benefits and alternatives of sinus surgery were explained to the patient.  The risks are to include but not limited to bleeding infection ocular brain injury scar band formation septal perforation and nonresolution of symptoms.  In the interim she will go back on antibiotic namely Augmentin along with prednisone.      The patient indicates understanding of these issues and agrees to the plan.    No follow-ups on file.    Parish Webber MD  11/7/2024  6:13 PM

## 2024-12-09 ENCOUNTER — TELEPHONE (OUTPATIENT)
Facility: LOCATION | Age: 49
End: 2024-12-09

## 2024-12-09 DIAGNOSIS — J32.1 CHRONIC FRONTAL SINUSITIS: Primary | ICD-10-CM

## 2024-12-09 DIAGNOSIS — J32.0 CHRONIC MAXILLARY SINUSITIS: ICD-10-CM

## 2024-12-10 RX ORDER — ACETAMINOPHEN AND CODEINE PHOSPHATE 300; 30 MG/1; MG/1
TABLET ORAL
Qty: 10 TABLET | Refills: 0 | Status: SHIPPED | OUTPATIENT
Start: 2024-12-10

## 2024-12-10 RX ORDER — DIAZEPAM 5 MG/1
TABLET ORAL
Qty: 3 TABLET | Refills: 0 | Status: SHIPPED | OUTPATIENT
Start: 2024-12-10

## 2024-12-11 ENCOUNTER — MED REC SCAN ONLY (OUTPATIENT)
Facility: LOCATION | Age: 49
End: 2024-12-11

## 2024-12-13 ENCOUNTER — OFFICE VISIT (OUTPATIENT)
Facility: LOCATION | Age: 49
End: 2024-12-13
Payer: COMMERCIAL

## 2024-12-13 DIAGNOSIS — J32.0 CHRONIC MAXILLARY SINUSITIS: Primary | ICD-10-CM

## 2024-12-13 DIAGNOSIS — J32.1 CHRONIC FRONTAL SINUSITIS: ICD-10-CM

## 2024-12-13 PROCEDURE — 31296 NSL/SINS NDSC SURG FRNT SINS: CPT | Performed by: OTOLARYNGOLOGY

## 2024-12-13 RX ORDER — CEFUROXIME AXETIL 250 MG/1
250 TABLET ORAL 2 TIMES DAILY
Qty: 20 TABLET | Refills: 0 | Status: SHIPPED | OUTPATIENT
Start: 2024-12-13

## 2024-12-13 RX ORDER — PREDNISONE 5 MG/1
5 TABLET ORAL DAILY
Qty: 7 TABLET | Refills: 0 | Status: SHIPPED | OUTPATIENT
Start: 2024-12-13

## 2024-12-13 NOTE — PROGRESS NOTES
Balloon sinuplasty operative report    Preoperative diagnosis:  chronic maxillary sinusitis    Postoperative diagnosis:, chronic maxillary sinusitis    Procedure: Bilateral sinus endoscopic balloon bilateral sinuplasty maxillary sinuses.    Surgeon: Parish Webber MD    Anesthetic: Local, in office procedure, site of service #11, 6ml    Operative report: Patient is brought into the treatment area in the office, he is topically anesthetized with anesthetic spray.  Using the sinus endoscope 1% Xylocaine with 1 to 100,000 units of epinephrine was injected into the middle turbinate bilaterally and the uncinate process bilaterally.  The 0 degree sinus scope was used with video apparatus, the seedtag balloon system was used.    The balloon apparatus was then modified for the use in the maxillary sinus.  Again starting on the left side, using the 0 degree sinus scope.  The maxillary sinus seeker was used to locate the natural ostia of the maxillary sinus.  Then the balloon apparatus was placed directly through that site.  The lighted wire cable was advanced getting a good light pattern within the left maxillary sinus.  Then the balloon was advanced and then dilated to 12 PSI.  The balloon was then removed.  A similar procedure was performed on the opposite side.    Patient tolerated procedure well.  No complications minimal blood loss.    Routine post balloon instructions were given to the patient both verbally as well is a postop instruction sheet.  As per our usual patient was required to have a  and instructions were given to the  as well.  The patient was sent home on antibiotic and prednisone.  She will follow-up in 1 week.

## 2024-12-17 ENCOUNTER — MED REC SCAN ONLY (OUTPATIENT)
Facility: LOCATION | Age: 49
End: 2024-12-17

## 2024-12-19 ENCOUNTER — OFFICE VISIT (OUTPATIENT)
Facility: LOCATION | Age: 49
End: 2024-12-19
Payer: COMMERCIAL

## 2024-12-19 DIAGNOSIS — J32.0 CHRONIC MAXILLARY SINUSITIS: Primary | ICD-10-CM

## 2024-12-19 PROCEDURE — 31237 NSL/SINS NDSC SURG BX POLYPC: CPT | Performed by: OTOLARYNGOLOGY

## 2024-12-19 RX ORDER — METHYLPREDNISOLONE 4 MG/1
TABLET ORAL
Qty: 21 TABLET | Refills: 0 | Status: SHIPPED | OUTPATIENT
Start: 2024-12-19

## 2024-12-20 NOTE — PROGRESS NOTES
This patient is status post endoscopic sinus procedure 1 week ago.  In for her first postoperative debridement procedure.  Patient was topically anesthetized with anesthetic spray.  She was debrided using the 0 degree sinus scope bilaterally.  Patient tolerated the procedure well.  She was given  routine post debridement instructions.  She will follow-up as needed.  She is postop balloon sinuplasty of the maxillary sinuses.    She had a rash on Sunday and she stopped the prednisone.  She continued on the Ceftin.  She feels like the rash has gotten better.  She is going to switch to Augmentin.  She will continue on prednisone at a lower dose.  If there is any concern about rash we will consider a Medrol Dosepak.  She may continue with nasal saline and Flonase.  She will let me know if her sinus symptoms persist.

## 2025-01-14 ENCOUNTER — PATIENT MESSAGE (OUTPATIENT)
Dept: FAMILY MEDICINE CLINIC | Facility: CLINIC | Age: 50
End: 2025-01-14

## 2025-01-14 DIAGNOSIS — Z00.00 HEALTHCARE MAINTENANCE: Primary | ICD-10-CM

## 2025-01-14 DIAGNOSIS — E78.00 HYPERCHOLESTEREMIA: ICD-10-CM

## 2025-01-14 NOTE — TELEPHONE ENCOUNTER
Last Office Visit: 06/20/2024    Last Refill:   Medication Quantity Refills Start End   metoprolol succinate  MG Oral Tablet 24 Hr 90 tablet 1 6/20/2024 --     Return to Clinic: 12/20/2024    Protocol:     Please call patient to schedule medication follow up then route to new pcp.

## 2025-01-17 RX ORDER — METOPROLOL SUCCINATE 100 MG/1
100 TABLET, EXTENDED RELEASE ORAL DAILY
Qty: 30 TABLET | Refills: 0 | Status: SHIPPED | OUTPATIENT
Start: 2025-01-17

## 2025-01-17 NOTE — TELEPHONE ENCOUNTER
Patient called back, appointment scheduled for 2/13/25.    Can the following medication be refilled metoprolol succinate ER.    Please send to:  [facility] [address] [place] [address] [phone], [phone]

## 2025-01-22 ENCOUNTER — TELEPHONE (OUTPATIENT)
Dept: FAMILY MEDICINE CLINIC | Facility: CLINIC | Age: 50
End: 2025-01-22

## 2025-01-22 RX ORDER — METOPROLOL SUCCINATE 100 MG/1
100 TABLET, EXTENDED RELEASE ORAL DAILY
Qty: 90 TABLET | Refills: 0 | OUTPATIENT
Start: 2025-01-22

## 2025-01-22 NOTE — TELEPHONE ENCOUNTER
Monika from [employer] called and states we have sent #30 for patient's Metoprolol  mg but her insurance is not covering unless it is #90.  They are asking if okay to change the quantity.    If okay, please sign and approve pended RX below.  Thank you.    Return to office:  2/13/2025

## 2025-01-24 NOTE — TELEPHONE ENCOUNTER
Patient is asking for 90 tabs of Metoprolol. Next appt is not till 2/13/2025. Last OV with Steph Dressler 6/20/2025. PSR called the to schedule sooner. NEGIN Frank from [employer] called and states we have sent #30 for patient's Metoprolol  mg but her insurance is not covering unless it is #90.  They are asking if okay to change the quantity.     If okay, please sign and approve pended RX below.  Thank you.     Return to office:  2/13/2025

## 2025-01-24 NOTE — TELEPHONE ENCOUNTER
Please schedule patient sooner, to facilitate refill. Dr. Farias has appointments open next week.

## 2025-01-27 ENCOUNTER — HOSPITAL ENCOUNTER (OUTPATIENT)
Age: 50
Discharge: HOME | End: 2025-01-27
Payer: COMMERCIAL

## 2025-01-27 ENCOUNTER — APPOINTMENT (OUTPATIENT)
Dept: ULTRASOUND IMAGING | Age: 50
End: 2025-01-27
Attending: NURSE PRACTITIONER
Payer: COMMERCIAL

## 2025-01-27 VITALS
HEIGHT: 66 IN | SYSTOLIC BLOOD PRESSURE: 159 MMHG | TEMPERATURE: 99 F | OXYGEN SATURATION: 97 % | DIASTOLIC BLOOD PRESSURE: 102 MMHG | RESPIRATION RATE: 16 BRPM | HEART RATE: 65 BPM | WEIGHT: 140 LBS | BODY MASS INDEX: 22.5 KG/M2

## 2025-01-27 DIAGNOSIS — M79.662 BILATERAL CALF PAIN: Primary | ICD-10-CM

## 2025-01-27 DIAGNOSIS — M79.661 BILATERAL CALF PAIN: Primary | ICD-10-CM

## 2025-01-27 LAB
BUN BLD-MCNC: 12 MG/DL (ref 7–18)
CHLORIDE BLD-SCNC: 101 MMOL/L (ref 98–112)
CO2 BLD-SCNC: 25 MMOL/L (ref 21–32)
CREAT BLD-MCNC: 0.7 MG/DL
EGFRCR SERPLBLD CKD-EPI 2021: 105 ML/MIN/1.73M2 (ref 60–?)
GLUCOSE BLD-MCNC: 111 MG/DL (ref 70–99)
HCT VFR BLD CALC: 42 %
ISTAT IONIZED CALCIUM FOR CHEM 8: 1.14 MMOL/L (ref 1.12–1.32)
POTASSIUM BLD-SCNC: 3.6 MMOL/L (ref 3.6–5.1)
SODIUM BLD-SCNC: 139 MMOL/L (ref 136–145)

## 2025-01-27 PROCEDURE — 80047 BASIC METABLC PNL IONIZED CA: CPT | Performed by: NURSE PRACTITIONER

## 2025-01-27 PROCEDURE — 99213 OFFICE O/P EST LOW 20 MIN: CPT | Performed by: NURSE PRACTITIONER

## 2025-01-27 PROCEDURE — 93970 EXTREMITY STUDY: CPT | Performed by: NURSE PRACTITIONER

## 2025-01-27 NOTE — ED PROVIDER NOTES
Patient Seen in: [facility] [place] [place]      History     Chief Complaint   Patient presents with    Leg Pain     Stated Complaint: Lower leg pain    Subjective:   This a 49-year-old female with a history of hypertension, high cholesterol, and GERD.  Presents to immediate care for bilateral calf pain.  Worse on the right than the left.  Symptoms started after sedentary day of sitting in 4 hours of driving.  Pain is worse on the right than the left.  Reports severe pain when first putting her feet on the floor.  Pain is relieved with Tylenol, ibuprofen, and walking.  No chest pain or shortness of breath.  No history of blood clots.  No weakness or numbness in the lower legs.  No back or neck pain.  No direct trauma or injury to the lower legs.    The history is provided by the patient.             Objective:     Past Medical History:    Essential hypertension    Fatigue    HEADACHES    Heartburn    High cholesterol    Sleep disturbance    Weight gain              Past Surgical History:   Procedure Laterality Date    Ablation  2017    Carpal tunnel release      Hernia surgery      Needle biopsy left  2017    fibrocystic changes    Needle biopsy left  2017          x2    Oral surgery procedure      wisdom teeth    Tonsillectomy                  No pertinent social history.            Review of Systems   Constitutional:  Negative for chills and fever.   HENT:  Negative for congestion and sore throat.    Respiratory:  Negative for cough, shortness of breath and wheezing.    Cardiovascular:  Negative for chest pain, palpitations and leg swelling.   Gastrointestinal:  Negative for abdominal pain, constipation, diarrhea, nausea and vomiting.   Musculoskeletal:  Positive for arthralgias and myalgias. Negative for back pain, neck pain and neck stiffness.   Skin:  Negative for rash.   Neurological:  Negative for weakness and numbness.       Positive for stated complaint: Lower leg pain  Other systems are as  noted in HPI.  Constitutional and vital signs reviewed.      All other systems reviewed and negative except as noted above.    Physical Exam     ED Triage Vitals [01/27/25 0807]   BP (!) 159/102   Pulse 65   Resp 16   Temp 99 °F (37.2 °C)   Temp src Oral   SpO2 97 %   O2 Device None (Room air)       Current Vitals:   Vital Signs  BP: (!) 159/102  Pulse: 65  Resp: 16  Temp: 99 °F (37.2 °C)  Temp src: Oral    Oxygen Therapy  SpO2: 97 %  O2 Device: None (Room air)        Physical Exam  Vitals and nursing note reviewed.   Constitutional:       General: She is not in acute distress.     Appearance: Normal appearance. She is not ill-appearing, toxic-appearing or diaphoretic.   HENT:      Head: Normocephalic and atraumatic.      Right Ear: External ear normal.      Left Ear: External ear normal.      Nose: Nose normal.      Mouth/Throat:      Mouth: Mucous membranes are moist.      Pharynx: Oropharynx is clear.   Eyes:      General:         Right eye: No discharge.         Left eye: No discharge.      Extraocular Movements: Extraocular movements intact.      Conjunctiva/sclera: Conjunctivae normal.   Cardiovascular:      Rate and Rhythm: Normal rate and regular rhythm.      Heart sounds: Normal heart sounds.   Pulmonary:      Effort: Pulmonary effort is normal. No respiratory distress.      Breath sounds: Normal breath sounds. No stridor. No wheezing, rhonchi or rales.   Musculoskeletal:         General: No tenderness.      Cervical back: Neck supple.      Right lower leg: No edema.      Left lower leg: No edema.   Skin:     General: Skin is warm and dry.      Capillary Refill: Capillary refill takes less than 2 seconds.      Findings: No rash.   Neurological:      Mental Status: She is alert and oriented to person, place, and time.   Psychiatric:         Mood and Affect: Mood normal.         Behavior: Behavior normal.             ED Course     Labs Reviewed   POCT ISTAT CHEM8 CARTRIDGE - Abnormal; Notable for the  following components:       Result Value    ISTAT Glucose 111 (*)     All other components within normal limits            Bilateral lower leg ultrasounds, i-STAT       MDM      Vital signs stable.  Patient is well-appearing and nontoxic looking.  Presents to immediate care for bilateral calf pain worse on the right than the left.    Differential diagnosis includes but is not limited to Strain, gastrocnemius, DVT, cellulitis, lymphedema, electrolyte disturbance    There is no calf tenderness, swelling, or skin color changes on exam.  CMS intact in bilateral lower extremities.    I-STAT is within normal limits.    Ultrasound films interpreted and reviewed by myself.  Results show no evidence of DVT.    Clinical impression is gastronemius    DC home.  Continue Tylenol and ibuprofen.  Frequent walking.  Rotating ice and heat.  Recommended supplement magnesium glycinate.  Close follow-up with PCP.  Reasons to seek emergent reevaluation reviewed.  Patient verbalized understanding, and agreed with plan of care.  All questions answered.         Medical Decision Making      Disposition and Plan     Clinical Impression:  1. Bilateral calf pain         Disposition:  Discharge  1/27/2025  9:50 am    Follow-up:  Montrell Farias MD  [address] [place] [id number] [phone]                Medications Prescribed:  Current Discharge Medication List              Supplementary Documentation:

## 2025-01-27 NOTE — ED INITIAL ASSESSMENT (HPI)
Patient reports bilateral lower leg pain that radiates down, right leg started just over a week ago and left leg started a couple of days later, denies numbness/tingling, pain excruciating upon standing and improves after patient starts ambulating, pt concerned for blood clot

## 2025-01-27 NOTE — DISCHARGE INSTRUCTIONS
Continue Tylenol and ibuprofen.  Resting, elevating, rotating ice and heat.  You may try the supplement magnesium glycinate.  Follow closely with your primary.  Drink plenty of fluids and walk frequently.

## 2025-01-31 RX ORDER — ROSUVASTATIN CALCIUM 20 MG/1
20 TABLET, COATED ORAL NIGHTLY
Qty: 30 TABLET | Refills: 0 | Status: SHIPPED | OUTPATIENT
Start: 2025-01-31

## 2025-01-31 NOTE — TELEPHONE ENCOUNTER
Pt can not come in any earlier. She could come next week but Dr Farias is out. She will be out of medication this weekend. Please advise. Thank you.     She also needs rosuvastatin 20 MG Oral Tab thank you.

## 2025-01-31 NOTE — TELEPHONE ENCOUNTER
Called [facility], spoke with pharmacy [facility]. She informed me that she will use a discount card for 30 tabs of both Metoprolol= pt's co-pay is going to be $18.47; Rosuvastatin=$18.47. [facility] will inform the pt. Left a message to call back.

## 2025-02-05 ENCOUNTER — APPOINTMENT (OUTPATIENT)
Dept: CT IMAGING | Facility: HOSPITAL | Age: 50
End: 2025-02-05
Attending: EMERGENCY MEDICINE
Payer: COMMERCIAL

## 2025-02-05 ENCOUNTER — HOSPITAL ENCOUNTER (EMERGENCY)
Facility: HOSPITAL | Age: 50
Discharge: HOME | End: 2025-02-05
Attending: EMERGENCY MEDICINE
Payer: COMMERCIAL

## 2025-02-05 ENCOUNTER — APPOINTMENT (OUTPATIENT)
Dept: GENERAL RADIOLOGY | Facility: HOSPITAL | Age: 50
End: 2025-02-05
Attending: EMERGENCY MEDICINE
Payer: COMMERCIAL

## 2025-02-05 ENCOUNTER — TELEPHONE (OUTPATIENT)
Dept: FAMILY MEDICINE CLINIC | Facility: CLINIC | Age: 50
End: 2025-02-05

## 2025-02-05 VITALS
BODY MASS INDEX: 21.69 KG/M2 | OXYGEN SATURATION: 98 % | HEART RATE: 67 BPM | RESPIRATION RATE: 16 BRPM | DIASTOLIC BLOOD PRESSURE: 94 MMHG | SYSTOLIC BLOOD PRESSURE: 161 MMHG | WEIGHT: 135 LBS | HEIGHT: 66 IN

## 2025-02-05 DIAGNOSIS — H54.7 VISION PROBLEMS: Primary | ICD-10-CM

## 2025-02-05 DIAGNOSIS — R51.9 NONINTRACTABLE HEADACHE, UNSPECIFIED CHRONICITY PATTERN, UNSPECIFIED HEADACHE TYPE: ICD-10-CM

## 2025-02-05 DIAGNOSIS — M79.10 MYALGIA: ICD-10-CM

## 2025-02-05 LAB
ALBUMIN SERPL-MCNC: 4.5 G/DL (ref 3.2–4.8)
ALBUMIN/GLOB SERPL: 1.4 {RATIO} (ref 1–2)
ALP LIVER SERPL-CCNC: 75 U/L
ALT SERPL-CCNC: 29 U/L
ANION GAP SERPL CALC-SCNC: 11 MMOL/L (ref 0–18)
AST SERPL-CCNC: 24 U/L (ref ?–34)
ATRIAL RATE: 64 BPM
B-HCG UR QL: NEGATIVE
BASOPHILS # BLD AUTO: 0.07 X10(3) UL (ref 0–0.2)
BASOPHILS NFR BLD AUTO: 0.7 %
BILIRUB SERPL-MCNC: 0.4 MG/DL (ref 0.3–1.2)
BILIRUB UR QL STRIP.AUTO: NEGATIVE
BUN BLD-MCNC: 10 MG/DL (ref 9–23)
CALCIUM BLD-MCNC: 9.6 MG/DL (ref 8.7–10.6)
CHLORIDE SERPL-SCNC: 101 MMOL/L (ref 98–112)
CLARITY UR REFRACT.AUTO: CLEAR
CO2 SERPL-SCNC: 27 MMOL/L (ref 21–32)
CREAT BLD-MCNC: 0.7 MG/DL
EGFRCR SERPLBLD CKD-EPI 2021: 105 ML/MIN/1.73M2 (ref 60–?)
EOSINOPHIL # BLD AUTO: 0.26 X10(3) UL (ref 0–0.7)
EOSINOPHIL NFR BLD AUTO: 2.6 %
ERYTHROCYTE [DISTWIDTH] IN BLOOD BY AUTOMATED COUNT: 13.2 %
GLOBULIN PLAS-MCNC: 3.2 G/DL (ref 2–3.5)
GLUCOSE BLD-MCNC: 83 MG/DL (ref 70–99)
GLUCOSE UR STRIP.AUTO-MCNC: NORMAL MG/DL
HCT VFR BLD AUTO: 40.4 %
HGB BLD-MCNC: 14.3 G/DL
IMM GRANULOCYTES # BLD AUTO: 0.03 X10(3) UL (ref 0–1)
IMM GRANULOCYTES NFR BLD: 0.3 %
KETONES UR STRIP.AUTO-MCNC: NEGATIVE MG/DL
LEUKOCYTE ESTERASE UR QL STRIP.AUTO: NEGATIVE
LYMPHOCYTES # BLD AUTO: 1.83 X10(3) UL (ref 1–4)
LYMPHOCYTES NFR BLD AUTO: 18.6 %
MCH RBC QN AUTO: 31.1 PG (ref 26–34)
MCHC RBC AUTO-ENTMCNC: 35.4 G/DL (ref 31–37)
MCV RBC AUTO: 87.8 FL
MONOCYTES # BLD AUTO: 0.87 X10(3) UL (ref 0.1–1)
MONOCYTES NFR BLD AUTO: 8.8 %
NEUTROPHILS # BLD AUTO: 6.78 X10 (3) UL (ref 1.5–7.7)
NEUTROPHILS # BLD AUTO: 6.78 X10(3) UL (ref 1.5–7.7)
NEUTROPHILS NFR BLD AUTO: 69 %
NITRITE UR QL STRIP.AUTO: NEGATIVE
OSMOLALITY SERPL CALC.SUM OF ELEC: 286 MOSM/KG (ref 275–295)
P AXIS: 42 DEGREES
P-R INTERVAL: 172 MS
PH UR STRIP.AUTO: 6 [PH] (ref 5–8)
PLATELET # BLD AUTO: 372 10(3)UL (ref 150–450)
POTASSIUM SERPL-SCNC: 3.4 MMOL/L (ref 3.5–5.1)
PROT SERPL-MCNC: 7.7 G/DL (ref 5.7–8.2)
PROT UR STRIP.AUTO-MCNC: NEGATIVE MG/DL
Q-T INTERVAL: 436 MS
QRS DURATION: 80 MS
QTC CALCULATION (BEZET): 449 MS
R AXIS: -20 DEGREES
RBC # BLD AUTO: 4.6 X10(6)UL
RBC UR QL AUTO: NEGATIVE
SODIUM SERPL-SCNC: 139 MMOL/L (ref 136–145)
SP GR UR STRIP.AUTO: 1.01 (ref 1–1.03)
T AXIS: 8 DEGREES
UROBILINOGEN UR STRIP.AUTO-MCNC: NORMAL MG/DL
VENTRICULAR RATE: 64 BPM
WBC # BLD AUTO: 9.8 X10(3) UL (ref 4–11)

## 2025-02-05 PROCEDURE — 85025 COMPLETE CBC W/AUTO DIFF WBC: CPT | Performed by: EMERGENCY MEDICINE

## 2025-02-05 PROCEDURE — 99285 EMERGENCY DEPT VISIT HI MDM: CPT

## 2025-02-05 PROCEDURE — 81003 URINALYSIS AUTO W/O SCOPE: CPT | Performed by: EMERGENCY MEDICINE

## 2025-02-05 PROCEDURE — 81025 URINE PREGNANCY TEST: CPT

## 2025-02-05 PROCEDURE — 36415 COLL VENOUS BLD VENIPUNCTURE: CPT

## 2025-02-05 PROCEDURE — 93005 ELECTROCARDIOGRAM TRACING: CPT

## 2025-02-05 PROCEDURE — 93010 ELECTROCARDIOGRAM REPORT: CPT

## 2025-02-05 PROCEDURE — 80053 COMPREHEN METABOLIC PANEL: CPT | Performed by: EMERGENCY MEDICINE

## 2025-02-05 PROCEDURE — 71045 X-RAY EXAM CHEST 1 VIEW: CPT | Performed by: EMERGENCY MEDICINE

## 2025-02-05 PROCEDURE — 70450 CT HEAD/BRAIN W/O DYE: CPT | Performed by: EMERGENCY MEDICINE

## 2025-02-05 RX ORDER — PREDNISONE 20 MG/1
20 TABLET ORAL DAILY
Qty: 5 TABLET | Refills: 0 | Status: SHIPPED | OUTPATIENT
Start: 2025-02-05 | End: 2025-02-10

## 2025-02-05 NOTE — ED PROVIDER NOTES
Patient Seen in: [facility] Emergency Department      History     Chief Complaint   Patient presents with    Pain    Eye Visual Problem     Stated Complaint: blurred vision, pain to jaw, numbness around her top teeth, pain to bilateral l*    Subjective:   HPI      Patient comes to the Emergency Department with several weeks of a constellation of symptoms including visual disturbance, mild eye discomfort, headache, lower extremity discomfort primarily in her calves and scattered areas of paresthesias and numbness.  None of her symptoms are lateralizing or focal.  She has had no nausea or vomiting.  There is no history of physical trauma.  She has had no fever or chills.  She has no shortness of breath.  She does have a history of hypertension for which she takes beta-blockers.    Objective:     Past Medical History:    Essential hypertension    Fatigue    HEADACHES    Heartburn    High cholesterol    Sleep disturbance    Weight gain              Past Surgical History:   Procedure Laterality Date    Ablation  2017    Carpal tunnel release      Hernia surgery      Needle biopsy left  2017    fibrocystic changes    Needle biopsy left  2017          x2    Oral surgery procedure      wisdom teeth    Tonsillectomy  1992                Social History     Socioeconomic History    Marital status:    Tobacco Use    Smoking status: Never     Passive exposure: Past    Smokeless tobacco: Never   Vaping Use    Vaping status: Never Used   Substance and Sexual Activity    Alcohol use: Yes     Alcohol/week: 0.0 standard drinks of alcohol     Comment: 2-3 times a week    Drug use: No    Sexual activity: Yes     Partners: Male     Birth control/protection: Vasectomy, Surgical   Other Topics Concern    Caffeine Concern Yes     Comment: 1-2 cups daily    Exercise Yes     Comment: walking                  Physical Exam     ED Triage Vitals [25 1202]   BP (!) 166/109   Pulse 72   Resp 20   Temp    Temp src     SpO2 98 %   O2 Device None (Room air)       Current Vitals:   Vital Signs  BP: (!) 161/94  Pulse: 67  Resp: 16  MAP (mmHg): (!) 128    Oxygen Therapy  SpO2: 98 %  O2 Device: None (Room air)        Physical Exam  Vitals and nursing note reviewed.   Constitutional:       Appearance: She is well-developed.   HENT:      Head: Normocephalic.   Eyes:      Extraocular Movements: Extraocular movements intact.      Conjunctiva/sclera: Conjunctivae normal.      Pupils: Pupils are equal, round, and reactive to light.      Comments: Funduscopic exam bilaterally is unremarkable.   Cardiovascular:      Rate and Rhythm: Normal rate and regular rhythm.      Heart sounds: Normal heart sounds. No murmur heard.  Pulmonary:      Effort: Pulmonary effort is normal. No respiratory distress.      Breath sounds: Normal breath sounds.   Abdominal:      General: Bowel sounds are normal.      Palpations: Abdomen is soft.      Tenderness: There is no abdominal tenderness. There is no rebound.   Musculoskeletal:         General: Normal range of motion.      Cervical back: Normal range of motion and neck supple.      Comments: Mild discomfort to palpation in both calves.  No edema is noted.  No joint discomfort is noted.  There is no evidence of trauma.   Lymphadenopathy:      Cervical: No cervical adenopathy.   Skin:     General: Skin is warm and dry.      Findings: No rash.   Neurological:      Mental Status: She is alert and oriented to person, place, and time.      Sensory: No sensory deficit.      Comments: Patient has normal speech and gait.  She has no focal or lateralizing numbness or weakness.  She has no cerebellar signs.  Cognition is normal.            ED Course     Labs Reviewed   COMP METABOLIC PANEL (14) - Abnormal; Notable for the following components:       Result Value    Potassium 3.4 (*)     All other components within normal limits   POCT PREGNANCY URINE - Normal   CBC WITH DIFFERENTIAL WITH PLATELET   URINALYSIS, ROUTINE    RAINBOW DRAW LAVENDER   RAINBOW DRAW LIGHT GREEN   RAINBOW DRAW BLUE   RAINBOW DRAW GOLD     EKG    Rate, intervals and axes as noted on EKG Report.  Rate: 64  Rhythm: Sinus Rhythm  Reading: No acute ST or T wave abnormalities noted.           ED Course as of 02/05/25 1445  ------------------------------------------------------------  Time: 02/05 1326  Value: Comp Metabolic Panel (14)(!)  Comment: Unremarkable    ------------------------------------------------------------  Time: 02/05 1326  Value: CBC With Differential With Platelet  Comment: Unremarkable    ------------------------------------------------------------  Time: 02/05 1326  Value: XR CHEST AP PORTABLE  (CPT=71045)  Comment: Images were independently viewed by me and no infiltrate noted.  Mediastinal and cardiac silhouettes were normal.    ------------------------------------------------------------  Time: 02/05 1436  Value: CT BRAIN OR HEAD (CPT=70450)  Comment: No evidence of intracranial hemorrhage.  ------------------------------------------------------------  Time: 02/05 1444  Comment: I spoke with the patient's primary service regarding the patient's constellation of symptoms.  They were in agreement that the patient's symptoms were concerning for MS, but also agreed that patient did not require immediate MRI imaging in the emergency department.  They were also in agreement to start the patient on a course of prednisone to help moderate her symptoms while she is waiting for further outpatient evaluation.  Arrangements were made for patient to be followed expeditiously as an outpatient and complete her workup as an outpatient.              MDM      Patient comes to the Emergency Department with various symptoms, including vision difficulties, headache and myalgias.  Differential diagnosis included intracranial hemorrhage because of the patient's hypertension and headache.  However, head CT was unremarkable.  Also considered were infectious and  metabolic disorders, but patient's workup including lab work, chest x-ray and EKG were all unremarkable.  I did discuss the patient's presentation and symptoms with her primary service and made arrangements for patient to be followed up expeditiously.  It was also agreed to start the patient on prednisone.  The patient stated also that she was able to contact her primary service and make arrangements for follow-up tomorrow morning.        Medical Decision Making      Disposition and Plan     Clinical Impression:  1. Vision problems    2. Nonintractable headache, unspecified chronicity pattern, unspecified headache type    3. Myalgia         Disposition:  Discharge  2/5/2025  2:43 pm    Follow-up:  Montrell Farias MD  [facility] [place] [id number] [phone]    Call in 1 day(s)            Medications Prescribed:  Current Discharge Medication List        START taking these medications    Details   predniSONE 20 MG Oral Tab Take 1 tablet (20 mg total) by mouth daily for 5 days.  Qty: 5 tablet, Refills: 0                 Supplementary Documentation:

## 2025-02-05 NOTE — TELEPHONE ENCOUNTER
Dr. Farias patient. Received call from the ER doctor.  Patient is currently in the ER undergoing workup, he is concerned for possible MS due to multiple symptoms patient is experiencing.  He discussed trial of oral steroids and to schedule ER follow-up visit for patient to discuss symptoms and do further workup including MRI brain and spine, autoimmune workup.  Patient has upcoming appointment with Dr. Farias, please changes to an ER follow-up or see if there is a sooner appointment available for ER follow-up with him. Thank you.

## 2025-02-05 NOTE — ED QUICK NOTES
Pt reevaluated by dr. Loza. Pt informed of her test reports and plan of care. Verbalizing understanding

## 2025-02-05 NOTE — ED INITIAL ASSESSMENT (HPI)
Pt c/o blurred vision x2 weeks, went to eye doc for it and got drops for it but has not helped. Pt c/o bilateral jaw pain x one week ago. Pt started today to have numbness of top teeth. Pt c/o bilateral knee down to ankles. +tingling and numbness and burning to legs. Denies recent trauma or mouth surgery.  Pt c/o headaches x3 weeks, pain to touch eye lids.

## 2025-02-06 ENCOUNTER — OFFICE VISIT (OUTPATIENT)
Dept: FAMILY MEDICINE CLINIC | Facility: CLINIC | Age: 50
End: 2025-02-06
Payer: COMMERCIAL

## 2025-02-06 VITALS
DIASTOLIC BLOOD PRESSURE: 76 MMHG | HEART RATE: 66 BPM | OXYGEN SATURATION: 98 % | SYSTOLIC BLOOD PRESSURE: 126 MMHG | WEIGHT: 238 LBS | RESPIRATION RATE: 16 BRPM | BODY MASS INDEX: 38.25 KG/M2 | HEIGHT: 66 IN | TEMPERATURE: 98 F

## 2025-02-06 DIAGNOSIS — H53.8 BLURRY VISION, BILATERAL: Primary | ICD-10-CM

## 2025-02-06 DIAGNOSIS — R51.9 FRONTAL HEADACHE: ICD-10-CM

## 2025-02-06 DIAGNOSIS — M25.50 MULTIPLE JOINT PAIN: ICD-10-CM

## 2025-02-06 DIAGNOSIS — M79.604 BILATERAL LEG PAIN: ICD-10-CM

## 2025-02-06 DIAGNOSIS — M79.605 BILATERAL LEG PAIN: ICD-10-CM

## 2025-02-06 DIAGNOSIS — E87.6 HYPOKALEMIA: ICD-10-CM

## 2025-02-06 PROCEDURE — 3008F BODY MASS INDEX DOCD: CPT | Performed by: FAMILY MEDICINE

## 2025-02-06 PROCEDURE — 99214 OFFICE O/P EST MOD 30 MIN: CPT | Performed by: FAMILY MEDICINE

## 2025-02-06 PROCEDURE — 3078F DIAST BP <80 MM HG: CPT | Performed by: FAMILY MEDICINE

## 2025-02-06 PROCEDURE — 3074F SYST BP LT 130 MM HG: CPT | Performed by: FAMILY MEDICINE

## 2025-02-06 NOTE — PROGRESS NOTES
[facility] Family Medicine Office Note  Chief Complaint:   Chief Complaint   Patient presents with    ER F/U     Seen yesterday in ER for vision problems, headaches, and leg pain. Needs MRI order.        HPI:   This is a 49 year old female coming in for multiple vague constitutional symptoms.  Patient was in the ER recently with persistent frontal headaches along with intermittent blurry vision.  According to the patient, she saw ophthalmology and was given some steroid eyedrops which has slowly helped but is still having the same symptoms.  She continues to have moderate to severe frontal headaches.  She also is having off-and-on joint pains primarily in the legs but also systemically.  She has not had any recent injuries.  She does admit that she has had a lot of stress as she recently lost her father while in hospice last week.  Denies any family history of multiple sclerosis.      Past Medical History:    Essential hypertension    Fatigue    HEADACHES    Heartburn    High cholesterol    Sleep disturbance    Weight gain     Past Surgical History:   Procedure Laterality Date    Ablation  2017    Carpal tunnel release      Hernia surgery      Needle biopsy left  2017    fibrocystic changes    Needle biopsy left  2017          x2    Oral surgery procedure      wisdom teeth    Tonsillectomy       Social History:  Social History     Socioeconomic History    Marital status:    Tobacco Use    Smoking status: Never     Passive exposure: Past    Smokeless tobacco: Never   Vaping Use    Vaping status: Never Used   Substance and Sexual Activity    Alcohol use: Yes     Alcohol/week: 0.0 standard drinks of alcohol     Comment: 2-3 times a week    Drug use: No    Sexual activity: Yes     Partners: Male     Birth control/protection: Vasectomy, Surgical   Other Topics Concern    Caffeine Concern Yes     Comment: 1-2 cups daily    Exercise Yes     Comment: walking     Family History:  Family  History   Problem Relation Age of Onset    Breast Cancer Paternal Grandmother 63        age at dx 63    Diabetes Mother     Hypertension Mother     Hypertension Father     Heart Attack Father         41-42 years old    Alcohol and Other Disorders Associated Father      Allergies:  Allergies[1]  Current Meds:  Current Outpatient Medications   Medication Sig Dispense Refill    predniSONE 20 MG Oral Tab Take 1 tablet (20 mg total) by mouth daily for 5 days. 5 tablet 0    omeprazole 20 MG Oral Capsule Delayed Release Take 1 capsule (20 mg total) by mouth every morning before breakfast.      metoprolol succinate  MG Oral Tablet 24 Hr Take 1 tablet (100 mg total) by mouth daily. 30 tablet 0    FLUoxetine HCl 40 MG Oral Cap Take 1 capsule (40 mg total) by mouth daily. 90 capsule 1    rosuvastatin 20 MG Oral Tab Take 1 tablet (20 mg total) by mouth nightly. 90 tablet 1    rosuvastatin 20 MG Oral Tab Take 1 tablet (20 mg total) by mouth nightly. (Patient not taking: Reported on 2/6/2025) 30 tablet 0      Counseling given: Not Answered       REVIEW OF SYSTEMS:   ROS:  CONSTITUTIONAL:  Denies any unusual weight gain/loss, fever, chills, weakness or fatigue.  HEENT:  Eyes:  Denies visual loss, double vision or yellow sclerae, + intermittent blurry vision. Ears, Nose, Throat:  Denies hearing loss, sneezing, congestion, runny nose or sore throat.  CARDIOVASCULAR:  Denies chest pain, chest pressure or chest discomfort. No palpitations or edema.  Denies any dyspnea on exertion or at rest  RESPIRATORY:  Denies shortness of breath, cough  GASTROINTESTINAL:  Denies any abdominal pain, nausea, vomiting, diarrhea  NEUROLOGICAL:  + headache, denies dizziness, syncope, numbness or tingling in the extremities.  MUSCULOSKELETAL:  + multiple joint pains    EXAM:   /76   Pulse 66   Temp 97.8 °F (36.6 °C) (Temporal)   Resp 16   Ht 5' 6\" (1.676 m)   Wt 238 lb (108 kg)   SpO2 98%   BMI 38.41 kg/m²  Estimated body mass index is  38.41 kg/m² as calculated from the following:    Height as of this encounter: 5' 6\" (1.676 m).    Weight as of this encounter: 238 lb (108 kg).   Vital signs reviewed.Appears stated age, well groomed.  Physical Exam:  GEN:  Patient is alert and oriented x3, no apparent distress  HEAD:  Normocephalic, atraumatic  HEENT:  Eyes: EOMI, PERRLA, no scleral icterus, conjunctivae clear bilaterally.  Ears: TM's clear and visible bilaterally, no excess cerumen or erythema.  Throat:  No tonsillar erythema or exudate.  Mouth:  No oral lesions or ulcerations, no dental abnormalities noted.  NECK:  Supple, no LAD  LUNGS: clear to auscultation bilaterally, no rales/rhonchi/wheezing  HEART:  Regular rate and rhythm, no murmurs, rubs or gallops  ABDOMEN:  Soft, nondistended, nontender, bowel sounds normal in all 4 quadrants, no hepatosplenomegaly  EXTREMITIES:  Strength intact with 5/5 bilaterally upper and lower extremities, no edema noted  NEURO:  CN 2 - 12 grossly intact     ASSESSMENT AND PLAN:   1. Blurry vision, bilateral  -  r/o MS vs. Stress reaction  -  check MRI brain  -  if MRI concerning for MS, will refer to neurology  -  further recs once MRI is finalized  - z [facility] MRI BRAIN (CPT=70551); Future  - z [facility] MRI BRAIN (CPT=70551)    2. Frontal headache  -  see above  - z [facility] MRI BRAIN (CPT=70551); Future  - z [facility] MRI BRAIN (CPT=70551)    3. Hypokalemia  -  recheck BMP in one week  -  increase potassium rich foods  - Basic Metabolic Panel (8) [E]; Future    4. Bilateral leg pain  -  recent venous doppler US negative  -  improved with NSAIDS  - z [facility] MRI BRAIN (CPT=70551); Future  - z [facility] MRI BRAIN (CPT=70551)    5. Multiple joint pain  -  see above  - z [facility] MRI BRAIN (CPT=70551); Future  - z [facility] MRI BRAIN (CPT=70551)      Meds & Refills for this Visit:  Requested Prescriptions      No prescriptions requested or ordered in this encounter       Health Maintenance:  Health Maintenance Due   Topic  Date Due    Pneumococcal Vaccine: 50+ Years (1 of 1 - PCV) Never done    Zoster Vaccines (2 of 2) 04/11/2024    COVID-19 Vaccine (4 - 2024-25 season) 09/01/2024    Influenza Vaccine (1) 10/01/2024    Annual Depression Screening  01/01/2025    Pap Smear  03/06/2025       Patient/Caregiver Education: Patient/Caregiver Education: There are no barriers to learning. Medical education done.   Outcome: Patient verbalizes understanding. Patient is notified to call with any questions, complications, allergies, or worsening or changing symptoms.  Patient is to call with any side effects or complications from the treatments as a result of today.     Problem List:  Patient Active Problem List   Diagnosis    Chondromalacia patella    Osteoarthritis, knee    Plantar fasciitis of left foot    PMDD (premenstrual dysphoric disorder)    Chronic maxillary sinusitis    Chronic tension-type headache, intractable    Benign essential hypertension    Hypercholesteremia    HSV infection    Dysmenorrhea    At high risk for breast cancer    Perimenopause       JAMES MÉNDEZ, DO    Please note that portions of this note may have been completed with a voice recognition program. Efforts were made to edit the dictations but occasionally words are mis-transcribed.         [1]   Allergies  Allergen Reactions    Hep B [Hepatitis B Virus Vaccine]     Lisinopril OTHER (SEE COMMENTS)     constipation

## 2025-02-10 ENCOUNTER — LAB ENCOUNTER (OUTPATIENT)
Dept: LAB | Age: 50
End: 2025-02-10
Attending: FAMILY MEDICINE
Payer: COMMERCIAL

## 2025-02-10 ENCOUNTER — TELEPHONE (OUTPATIENT)
Dept: FAMILY MEDICINE CLINIC | Facility: CLINIC | Age: 50
End: 2025-02-10

## 2025-02-10 DIAGNOSIS — E87.6 HYPOKALEMIA: ICD-10-CM

## 2025-02-10 DIAGNOSIS — E78.00 HYPERCHOLESTEREMIA: ICD-10-CM

## 2025-02-10 DIAGNOSIS — Z00.00 HEALTHCARE MAINTENANCE: ICD-10-CM

## 2025-02-10 LAB
ALBUMIN SERPL-MCNC: 4.7 G/DL (ref 3.2–4.8)
ALBUMIN/GLOB SERPL: 1.7 {RATIO} (ref 1–2)
ALP LIVER SERPL-CCNC: 70 U/L
ALT SERPL-CCNC: 29 U/L
ANION GAP SERPL CALC-SCNC: 9 MMOL/L (ref 0–18)
AST SERPL-CCNC: 21 U/L (ref ?–34)
BASOPHILS # BLD AUTO: 0.1 X10(3) UL (ref 0–0.2)
BASOPHILS NFR BLD AUTO: 0.9 %
BILIRUB SERPL-MCNC: 0.4 MG/DL (ref 0.3–1.2)
BUN BLD-MCNC: 11 MG/DL (ref 9–23)
CALCIUM BLD-MCNC: 9.8 MG/DL (ref 8.7–10.6)
CHLORIDE SERPL-SCNC: 101 MMOL/L (ref 98–112)
CHOLEST SERPL-MCNC: 159 MG/DL (ref ?–200)
CO2 SERPL-SCNC: 31 MMOL/L (ref 21–32)
CREAT BLD-MCNC: 0.82 MG/DL
EGFRCR SERPLBLD CKD-EPI 2021: 87 ML/MIN/1.73M2 (ref 60–?)
EOSINOPHIL # BLD AUTO: 0.25 X10(3) UL (ref 0–0.7)
EOSINOPHIL NFR BLD AUTO: 2.2 %
ERYTHROCYTE [DISTWIDTH] IN BLOOD BY AUTOMATED COUNT: 13.2 %
EST. AVERAGE GLUCOSE BLD GHB EST-MCNC: 117 MG/DL (ref 68–126)
FASTING PATIENT LIPID ANSWER: YES
FASTING STATUS PATIENT QL REPORTED: YES
GLOBULIN PLAS-MCNC: 2.8 G/DL (ref 2–3.5)
GLUCOSE BLD-MCNC: 91 MG/DL (ref 70–99)
HBA1C MFR BLD: 5.7 % (ref ?–5.7)
HCT VFR BLD AUTO: 42.9 %
HDLC SERPL-MCNC: 46 MG/DL (ref 40–59)
HGB BLD-MCNC: 14.7 G/DL
IMM GRANULOCYTES # BLD AUTO: 0.06 X10(3) UL (ref 0–1)
IMM GRANULOCYTES NFR BLD: 0.5 %
LDLC SERPL CALC-MCNC: 86 MG/DL (ref ?–100)
LYMPHOCYTES # BLD AUTO: 3.38 X10(3) UL (ref 1–4)
LYMPHOCYTES NFR BLD AUTO: 30.1 %
MCH RBC QN AUTO: 31.1 PG (ref 26–34)
MCHC RBC AUTO-ENTMCNC: 34.3 G/DL (ref 31–37)
MCV RBC AUTO: 90.7 FL
MONOCYTES # BLD AUTO: 0.76 X10(3) UL (ref 0.1–1)
MONOCYTES NFR BLD AUTO: 6.8 %
NEUTROPHILS # BLD AUTO: 6.68 X10 (3) UL (ref 1.5–7.7)
NEUTROPHILS # BLD AUTO: 6.68 X10(3) UL (ref 1.5–7.7)
NEUTROPHILS NFR BLD AUTO: 59.5 %
NONHDLC SERPL-MCNC: 113 MG/DL (ref ?–130)
OSMOLALITY SERPL CALC.SUM OF ELEC: 291 MOSM/KG (ref 275–295)
PLATELET # BLD AUTO: 460 10(3)UL (ref 150–450)
POTASSIUM SERPL-SCNC: 3.8 MMOL/L (ref 3.5–5.1)
PROT SERPL-MCNC: 7.5 G/DL (ref 5.7–8.2)
RBC # BLD AUTO: 4.73 X10(6)UL
SODIUM SERPL-SCNC: 141 MMOL/L (ref 136–145)
T3 SERPL-MCNC: 1.23 NG/ML (ref 0.6–1.81)
T4 FREE SERPL-MCNC: 1.3 NG/DL (ref 0.8–1.7)
TRIGL SERPL-MCNC: 156 MG/DL (ref 30–149)
TSI SER-ACNC: 2.16 UIU/ML (ref 0.55–4.78)
VLDLC SERPL CALC-MCNC: 25 MG/DL (ref 0–30)
WBC # BLD AUTO: 11.2 X10(3) UL (ref 4–11)

## 2025-02-10 PROCEDURE — 84480 ASSAY TRIIODOTHYRONINE (T3): CPT | Performed by: FAMILY MEDICINE

## 2025-02-10 PROCEDURE — 84439 ASSAY OF FREE THYROXINE: CPT | Performed by: FAMILY MEDICINE

## 2025-02-10 PROCEDURE — 80061 LIPID PANEL: CPT | Performed by: FAMILY MEDICINE

## 2025-02-10 PROCEDURE — 80050 GENERAL HEALTH PANEL: CPT | Performed by: FAMILY MEDICINE

## 2025-02-10 PROCEDURE — 83036 HEMOGLOBIN GLYCOSYLATED A1C: CPT | Performed by: FAMILY MEDICINE

## 2025-02-10 NOTE — TELEPHONE ENCOUNTER
Patient has an appointment on 02/13/2025 fir a follow up ER visit with Dr. Farias. She was prescribed Prednisone 20 mg 1 daily for 5 days. She is requesting a refill so she can stay on this until she is seen in our office. Please advise

## 2025-02-10 NOTE — TELEPHONE ENCOUNTER
Patient would like refill on predniSONE 20 MG Oral Tab. Medication was prescribed at emergency room and patient was hoping to get enough medication to last until this Thursday since she has appointment with Dr. Farias. Please advise.

## 2025-02-10 NOTE — TELEPHONE ENCOUNTER
Patient informed of Dr. Farias's note below. Patient verbalized understanding. She also wants to inform that she just finished the MRI today-  pending results.

## 2025-02-10 NOTE — TELEPHONE ENCOUNTER
we should hold off on this because she will likely need to follow-up with neurology for further recommendations

## 2025-02-13 ENCOUNTER — PATIENT MESSAGE (OUTPATIENT)
Dept: FAMILY MEDICINE CLINIC | Facility: CLINIC | Age: 50
End: 2025-02-13

## 2025-02-13 ENCOUNTER — OFFICE VISIT (OUTPATIENT)
Dept: FAMILY MEDICINE CLINIC | Facility: CLINIC | Age: 50
End: 2025-02-13
Payer: COMMERCIAL

## 2025-02-13 VITALS
TEMPERATURE: 98 F | SYSTOLIC BLOOD PRESSURE: 122 MMHG | BODY MASS INDEX: 38.09 KG/M2 | RESPIRATION RATE: 18 BRPM | DIASTOLIC BLOOD PRESSURE: 70 MMHG | HEART RATE: 66 BPM | HEIGHT: 66 IN | WEIGHT: 237 LBS

## 2025-02-13 DIAGNOSIS — R26.9 GAIT DISTURBANCE: ICD-10-CM

## 2025-02-13 DIAGNOSIS — G44.52 NEW DAILY PERSISTENT HEADACHE: ICD-10-CM

## 2025-02-13 DIAGNOSIS — E78.00 HYPERCHOLESTEREMIA: ICD-10-CM

## 2025-02-13 DIAGNOSIS — I10 BENIGN ESSENTIAL HYPERTENSION: ICD-10-CM

## 2025-02-13 PROCEDURE — 3074F SYST BP LT 130 MM HG: CPT | Performed by: FAMILY MEDICINE

## 2025-02-13 PROCEDURE — 99214 OFFICE O/P EST MOD 30 MIN: CPT | Performed by: FAMILY MEDICINE

## 2025-02-13 PROCEDURE — G2211 COMPLEX E/M VISIT ADD ON: HCPCS | Performed by: FAMILY MEDICINE

## 2025-02-13 PROCEDURE — 3008F BODY MASS INDEX DOCD: CPT | Performed by: FAMILY MEDICINE

## 2025-02-13 PROCEDURE — 3078F DIAST BP <80 MM HG: CPT | Performed by: FAMILY MEDICINE

## 2025-02-13 RX ORDER — FLUOXETINE HYDROCHLORIDE 40 MG/1
40 CAPSULE ORAL DAILY
Qty: 90 CAPSULE | Refills: 0 | Status: SHIPPED | OUTPATIENT
Start: 2025-02-13

## 2025-02-13 RX ORDER — METOPROLOL SUCCINATE 100 MG/1
100 TABLET, EXTENDED RELEASE ORAL DAILY
Qty: 90 TABLET | Refills: 0 | Status: SHIPPED | OUTPATIENT
Start: 2025-02-13

## 2025-02-13 RX ORDER — PREDNISOLONE ACETATE 10 MG/ML
1 SUSPENSION/ DROPS OPHTHALMIC 2 TIMES DAILY
COMMUNITY
Start: 2025-01-30 | End: 2025-02-13 | Stop reason: ALTCHOICE

## 2025-02-13 RX ORDER — ROSUVASTATIN CALCIUM 20 MG/1
20 TABLET, COATED ORAL NIGHTLY
Qty: 90 TABLET | Refills: 0 | Status: SHIPPED | OUTPATIENT
Start: 2025-02-13

## 2025-02-14 RX ORDER — PREDNISONE 50 MG/1
50 TABLET ORAL DAILY
Qty: 10 TABLET | Refills: 0 | Status: SHIPPED | OUTPATIENT
Start: 2025-02-14 | End: 2025-02-24

## 2025-02-14 NOTE — PROGRESS NOTES
[facility] Office Note  Chief Complaint:   Chief Complaint   Patient presents with    Lab Results     Labs completed on 02/10/2025    Test Results     MRI completed on 02/10/2025       HPI:   This is a 49 year old female coming in for follow-up.  The patient has been seen in the emergency room as well as another office visit due to visual disturbances headaches lower extremity discomfort of her calves as well as some episodes of numbness and tingling patient states that she also has continued right temple pain and headaches.  Patient states that this has been going on for the last couple months and has not had any improvement.  She has not had any trauma or injury to any of these body parts.  She had been trying to use over-the-counter medications to help with this.  She does have a history of hypercholesterolemia hypertension      Past Medical History:    Essential hypertension    Fatigue    HEADACHES    Heartburn    High cholesterol    Sleep disturbance    Weight gain     Past Surgical History:   Procedure Laterality Date    Ablation  2017    Carpal tunnel release      Hernia surgery      Needle biopsy left  2017    fibrocystic changes    Needle biopsy left  2017          x2    Oral surgery procedure      wisdom teeth    Tonsillectomy       Social History:  Social History     Socioeconomic History    Marital status:    Tobacco Use    Smoking status: Never     Passive exposure: Past    Smokeless tobacco: Never   Vaping Use    Vaping status: Never Used   Substance and Sexual Activity    Alcohol use: Yes     Alcohol/week: 0.0 standard drinks of alcohol     Comment: 2-3 times a week    Drug use: No    Sexual activity: Yes     Partners: Male     Birth control/protection: Vasectomy, Surgical   Other Topics Concern    Caffeine Concern Yes     Comment: 1-2 cups daily    Exercise Yes     Comment: walking     Family History:  Family History   Problem Relation Age of Onset     Breast Cancer Paternal Grandmother 63        age at dx 63    Diabetes Mother     Hypertension Mother     Hypertension Father     Heart Attack Father         41-42 years old    Alcohol and Other Disorders Associated Father      Allergies:  Allergies[1]  Current Meds:  Current Outpatient Medications   Medication Sig Dispense Refill    FLUoxetine HCl 40 MG Oral Cap Take 1 capsule (40 mg total) by mouth daily. 90 capsule 0    metoprolol succinate  MG Oral Tablet 24 Hr Take 1 tablet (100 mg total) by mouth daily. 90 tablet 0    rosuvastatin 20 MG Oral Tab Take 1 tablet (20 mg total) by mouth nightly. 90 tablet 0    omeprazole 20 MG Oral Capsule Delayed Release Take 1 capsule (20 mg total) by mouth every morning before breakfast.        Counseling given: Not Answered       REVIEW OF SYSTEMS:   Consitutional: No fevers, chills, sweats  Eye: No recent visual problems  ENMT: No ear pain nasal congestion sore throat  Respiratory: No shortness of breath, cough  Cardiovascular: No chest pain palpitations syncope  Gastrointestinal: No nausea vomiting diarrhea  Genitourinary: No hematuria  Hema/Lymph no bruising tendency, swollen lymph glands  Endocrine: Negative for excessive thirst excessive hunger  Musculoskeletal: No back pain neck pain joint pain muscle pain decreased range of motion  Integumentary: No rash, pruritus, abrasions  Neurologic: Alert and oriented x4  Psychiatric: No anxiety, depression    Medical, surgical, family, and social histories were reviewed      EXAM:   VITALS:   Vitals:    02/13/25 1218   BP: 122/70   Pulse: 66   Resp: 18   Temp: 98.1 °F (36.7 °C)      GENERAL: well developed, well nourished, in no apparent distress  SKIN: no rashes, no suspicious lesions: Cool and Dry  HEENT: atraumatic, normocephalic, ears and throat are clear    Ears: TM's clear and visible bilaterally, no excess cerumen or erythema.   EYES: Pupils equal round and reactive.  Extraocular motions intact no scleral icterus no  injection or drainage  THROAT without erythema tonsillar hypertrophy or exudate.  Uvula midline airway patent  NECK: Given midline.  No JVD or lymphadenopathy supple nontender no meningeal signs   LUNGS: clear to auscultation sounds equal bilaterally no wheezes rales or rhonchi  CARDIO: Regular rate and rhythm without murmurs gallops or rubs  GI: Soft nontender nondistended no hepatomegaly palpable masses.  No guarding.   without deformity or crepitus no flank tenderness  EXTREMITIES: no cyanosis, clubbing or edema no joint tenderness effusion or edema noted.  No calf tenderness negative Homans' sign bilaterally  NEURO: Awake and alert.  Cranial nerves II through XII intact motor and sensory grossly within normal limits.  5 out of 5 muscle strength in all muscle groups.  Normal speech.  PSYCHIATRIC: Awake, alert and oriented x3, cooperative appropriate mood and affect.  Judgment intact       ASSESSMENT AND PLAN:   1. New daily persistent headache  - Neuro Referral - In Network  I did discuss with the patient that the MRI of the brain did not show any significant findings I did discuss with her at this time with the continued headache as well as the right temporal pain that she has been feeling would like for her to follow-up with neurology for further recommendations I did also discuss if the appointment is not for another month I would have her start prednisone.  She was given prednisone in the emergency room and did have significant improvement.  2. Gait disturbance  - Neuro Referral - In Network    3. Hypercholesteremia  - rosuvastatin 20 MG Oral Tab; Take 1 tablet (20 mg total) by mouth nightly.  Dispense: 90 tablet; Refill: 0  Patient's LDL has been well-controlled she was asked to continue with the rosuvastatin she was asked to watch her fatty food fried food intake  4. Benign essential hypertension  - metoprolol succinate  MG Oral Tablet 24 Hr; Take 1 tablet (100 mg total) by mouth daily.   Dispense: 90 tablet; Refill: 0  Patient's blood pressure has been well-controlled she was asked to continue with the current medication she was given refills of this today.    Meds & Refills for this Visit:  Requested Prescriptions     Signed Prescriptions Disp Refills    FLUoxetine HCl 40 MG Oral Cap 90 capsule 0     Sig: Take 1 capsule (40 mg total) by mouth daily.    metoprolol succinate  MG Oral Tablet 24 Hr 90 tablet 0     Sig: Take 1 tablet (100 mg total) by mouth daily.    rosuvastatin 20 MG Oral Tab 90 tablet 0     Sig: Take 1 tablet (20 mg total) by mouth nightly.       Health Maintenance:  Health Maintenance Due   Topic Date Due    Pneumococcal Vaccine: 50+ Years (1 of 1 - PCV) Never done    Zoster Vaccines (2 of 2) 04/11/2024    COVID-19 Vaccine (4 - 2024-25 season) 09/01/2024    Influenza Vaccine (1) 10/01/2024    Annual Depression Screening  01/01/2025    Pap Smear  03/06/2025       Patient/Caregiver Education: Patient/Caregiver Education: There are no barriers to learning. Medical education done.   Outcome: Patient verbalizes understanding. Patient is notified to call with any questions, complications, allergies, or worsening or changing symptoms.  Patient is to call with any side effects or complications from the treatments as a result of today.     Problem List:  Patient Active Problem List   Diagnosis    Chondromalacia patella    Osteoarthritis, knee    Plantar fasciitis of left foot    PMDD (premenstrual dysphoric disorder)    Chronic maxillary sinusitis    Chronic tension-type headache, intractable    Benign essential hypertension    Hypercholesteremia    HSV infection    Dysmenorrhea    At high risk for breast cancer    Perimenopause         No follow-ups on file.     Montrell Farias MD    Please note that portions of this note may have been completed with a voice recognition program. Efforts were made to edit the dictations but occasionally words are mis-transcribed.       [1]    Allergies  Allergen Reactions    Hep B [Hepatitis B Virus Vaccine]     Lisinopril OTHER (SEE COMMENTS)     constipation

## 2025-04-15 ENCOUNTER — OFFICE VISIT (OUTPATIENT)
Dept: NEUROLOGY | Facility: CLINIC | Age: 50
End: 2025-04-15
Payer: COMMERCIAL

## 2025-04-15 VITALS
RESPIRATION RATE: 16 BRPM | WEIGHT: 243 LBS | SYSTOLIC BLOOD PRESSURE: 144 MMHG | DIASTOLIC BLOOD PRESSURE: 83 MMHG | BODY MASS INDEX: 39 KG/M2 | HEART RATE: 83 BPM

## 2025-04-15 DIAGNOSIS — G44.89 OTHER HEADACHE SYNDROME: Primary | ICD-10-CM

## 2025-04-15 DIAGNOSIS — M79.605 PAIN IN BOTH LOWER EXTREMITIES: ICD-10-CM

## 2025-04-15 DIAGNOSIS — M79.604 PAIN IN BOTH LOWER EXTREMITIES: ICD-10-CM

## 2025-04-15 DIAGNOSIS — G25.0 ESSENTIAL TREMOR: ICD-10-CM

## 2025-04-15 PROCEDURE — 99204 OFFICE O/P NEW MOD 45 MIN: CPT | Performed by: OTHER

## 2025-04-15 PROCEDURE — 3077F SYST BP >= 140 MM HG: CPT | Performed by: OTHER

## 2025-04-15 PROCEDURE — 3079F DIAST BP 80-89 MM HG: CPT | Performed by: OTHER

## 2025-04-15 RX ORDER — MULTIVITAMIN
1 TABLET ORAL
COMMUNITY

## 2025-04-15 RX ORDER — CETIRIZINE HYDROCHLORIDE 10 MG/1
10 TABLET ORAL DAILY
COMMUNITY

## 2025-04-15 RX ORDER — MAGNESIUM 200 MG
200 TABLET ORAL
COMMUNITY

## 2025-04-15 NOTE — H&P
Neurology H&P    Emily Wheeler Patient Status:  No patient class for patient encounter    1975 MRN [id number]   Location [facility], [address] [place] [address] Attending No att. providers found   Hosp Day # 0 PCP Montrell Farias MD     Subjective:  Emily Wheeler is a(n) 49 year old female with a past medical history significant for HL and TN and migraines.  She was neurology clinic for evaluation of multiple symptoms. She states that she had BLE pain. THis was so bad that she felt like they were going to give out on her. She had no falls and reports that her balance has never really been that great. Her pain completely resolved after a couple weeks. This pain was in her calves. She was taking Motrin for this. She has not had any pain since 2025.She has no numbness or tingling at this time. She states that sometimes she gets a numbness and tingling in the feet. She also states that at about the same time she had blurry vision and bloodshot eyes. She had headaches as well with this. She state that her teeth hurt and she had some sensitivity to light with her headaches but nausea or vomiting She had a father with migraines but no personal gh/o migraines. She had an MRI brain which as noted below was grossly unremarkable. She saw an eye doctor and was prescribed prednisone drops and this helped the pain a little and she eventually stopped the eye drops. She states that she still gets intermittent headaches. She has a tenderness sometimes on the R temple area. All of her symptoms occurred after the death of her father and she reports being under a lot more stress at that time. She is not getting any sever headaches. Things seems to have settled down. She sometimes still gets a sharp pain in the R temple. She also has had a slight tremor in the L hand for about 3-4 years.     Current Medications:  Current Medications[1]    Problem List:  Problem List[2]    PMHx:  Past Medical  History[3]    PSHx:  Past Surgical History[4]    SocHx:  Short Social Hx on File[5]    Family History:  Family History[6]        ROS:  10 point ROS completed and was negative, except for pertinent positive and negatives stated in subjective.    Objective/Physical Exam:    Vital Signs:  not currently breastfeeding.    Gen: Awake and in no apparent distress  HEENT: moist mucus membranes  Neck: Supple  Cardiovascular: Regular rate and rhythm, no murmur  Pulm: CTAB  GI: non-tender, normal bowel sounds  Skin: normal, dry  Extremities: No clubbing or cyanosis      Neurologic:   MENTAL STATUS: alert, ox3, normal attention, language and fund of knowledge.      CRANIAL NERVES II to XII: PERRLA, no ptosis or diplopia, EOM intact, facial sensation intact, strong eye closure, face is symmetric, no dysarthria, tongue midline,  no tongue fasciculations or atrophy, strong shoulder shrug.    MOTOR EXAMINATION: normal tone, no fasciculations, normal strength throughout in UEs and LEs     SENSORY EXAMINATION:  UE: intact to light touch, pinprick intact  LE: intact to light touch, pinprick intact    COORDINATION:  No dysmetria, or mild L hand positional tremor    REFLEXES: 2+ at biceps, 2+ brachioradialis, 2+ at patella     GAIT: normal stance, normal toe gait and heel gait, tandem well.       Labs:       Imaging:  MRI Brain 2/10/25  IMPRESSION:     A few sub-5 mm T2/FLAIR hyperintense signal foci in the subcortical white matter are nonspecific and of doubtful clinical significance.     Assessment:  This is a 49-year-old female with multiple symptoms.  She has a slight tremor in the left hand.  This appears clinically essential tremor.  She has no evidence of Parkinson disease on exam.  No shuffling gait, no reduced armswing, no hypomimia, no shuffling gait or bradykinesia.  She is already on a beta-blocker at this time.  This is not too bothersome to her so we will not treat with any medication such as Mysoline or gabapentin etc. at  this time.  Her leg pain only occurred for a couple week period in January.  She has not had any pain in her legs since that time.  We did discuss possibly getting a nerve conduction study and I did describe this to her today but we will hold off on this for now as her symptoms have completely resolved.  She does have a normal nonfocal neurologic examination today.  As for her headaches it is possible that she was getting cluster headaches.  She does describe conjunctival injection and sharp stabbing pain on the right temporal area.  I did offer to get a sedimentation rate though I doubt GCA at this time.  She declines any blood work for me today.  I did review the MRI of her brain and it looks grossly unremarkable.  A couple of very small T2 flair changes which could be related to headaches.  She reports that she is not having any significant headaches at all at this time so we will hold off any medications for headache pain as well.  If they do return week of course consider treating as cluster headache or migraine variant.      Plan:  Headache   - ? Cluster headache  - Declined any SED rate and I doubt GCA  - MRI brain reviewed    2. Leg pain  - Now resolved  - Declined EMG at this time    3. Essential tremor  - Declines any medications at this time    Donald Vaughan, DO  Neurology         [1]   Current Outpatient Medications   Medication Sig Dispense Refill    FLUoxetine HCl 40 MG Oral Cap Take 1 capsule (40 mg total) by mouth daily. 90 capsule 0    metoprolol succinate  MG Oral Tablet 24 Hr Take 1 tablet (100 mg total) by mouth daily. 90 tablet 0    rosuvastatin 20 MG Oral Tab Take 1 tablet (20 mg total) by mouth nightly. 90 tablet 0    omeprazole 20 MG Oral Capsule Delayed Release Take 1 capsule (20 mg total) by mouth every morning before breakfast.     [2]   Patient Active Problem List  Diagnosis    Chondromalacia patella    Osteoarthritis, knee    Plantar fasciitis of left foot    PMDD (premenstrual  dysphoric disorder)    Chronic maxillary sinusitis    Chronic tension-type headache, intractable    Benign essential hypertension    Hypercholesteremia    HSV infection    Dysmenorrhea    At high risk for breast cancer    Perimenopause   [3]   Past Medical History:   Essential hypertension    Fatigue    HEADACHES    Heartburn    High cholesterol    Sleep disturbance    Weight gain   [4]   Past Surgical History:  Procedure Laterality Date    Ablation  2017    Carpal tunnel release      Hernia surgery      Needle biopsy left  2017    fibrocystic changes    Needle biopsy left  2017          x2    Oral surgery procedure      wisdom teeth    Tonsillectomy     [5]   Social History  Socioeconomic History    Marital status:    Tobacco Use    Smoking status: Never     Passive exposure: Past    Smokeless tobacco: Never   Vaping Use    Vaping status: Never Used   Substance and Sexual Activity    Alcohol use: Yes     Alcohol/week: 0.0 standard drinks of alcohol     Comment: 2-3 times a week    Drug use: No    Sexual activity: Yes     Partners: Male     Birth control/protection: Vasectomy, Surgical   Other Topics Concern    Caffeine Concern Yes     Comment: 1-2 cups daily    Exercise Yes     Comment: walking   [6]   Family History  Problem Relation Age of Onset    Breast Cancer Paternal Grandmother 63        age at dx 63    Diabetes Mother     Hypertension Mother     Hypertension Father     Heart Attack Father         41-42 years old    Alcohol and Other Disorders Associated Father

## 2025-04-15 NOTE — PROGRESS NOTES
Pt reports she believes she could have MS due to     3 months ago pt began getting pain below knee and had difficulty standing, woke her up a night, she reports she did seek care at  for this pain.  Pt notes the same week she had blurry vision and noted pain in right eye, did seek care at opthalmology for this pain.    Pt reports she has having headaches after this time.  Pt to ER on 2/5/2025.  Pt reports ER doctor informed her she might have MS.     Pt reports she has a history of headaches, but headaches intensified during the time noted above of high stress.    Pt states she has headaches 3-4 times per week, she states they come on like a sharp pain and last approx 30 min.  Does note sensitivity of light during headaches, no vomiting, nausea.     Pt states she has a worsening tremor in the left hand that began approx 4 years.

## 2025-04-15 NOTE — PATIENT INSTRUCTIONS
After your visit at the [facility] today,  please direct any follow up questions or medication needs to the staff in our [place] office so that your concerns may be promptly addressed.  We are available through [employer] or at the numbers below:    The phone number is:   [phone] option #1    The fax number is:  [phone]    Your pharmacy should also send any requests electronically to the [place] office.  Refill policies:    Allow 2-3 business days for refills; controlled substances may take longer.  Contact your pharmacy at least 5 days prior to running out of medication and have them send an electronic request or submit request through the “request refill” option in your [employer] account.  Refills are not addressed on weekends; covering physicians do not authorize routine medications on weekends.  No narcotics or controlled substances are refilled after noon on Fridays or by on call physicians.  By law, narcotics must be electronically prescribed.  A 30 day supply with no refills is the maximum allowed.  If your prescription is due for a refill, you may be due for a follow up appointment.  To best provide you care, patients receiving routine medications need to be seen at least once a year.  Patients receiving narcotic/controlled substance medications need to be seen at least once every 3 months.  In the event that your preferred pharmacy does not have the requested medication in stock (e.g. Backordered), it is your responsibility to find another pharmacy that has the requested medication available.  We will gladly send a new prescription to that pharmacy at your request.    Scheduling Tests:    If your physician has ordered radiology tests such as MRI or CT scans, please contact Central Scheduling at [phone] right away to schedule the test.  Once scheduled, the [facility] Centralized Referral Team will work with your insurance carrier to obtain pre-certification or prior authorization.   Depending on your insurance carrier, approval may take 3-10 days.  It is highly recommended patients assure they have received an authorization before having a test performed.  If test is done without insurance authorization, patient may be responsible for the entire amount billed.      Precertification and Prior Authorizations:  If your physician has recommended that you have a procedure or additional testing performed the [facility] Centralized Referral Team will contact your insurance carrier to obtain pre-certification or prior authorization.    You are strongly encouraged to contact your insurance carrier to verify that your procedure/test has been approved and is a COVERED benefit.  Although the [facility] Centralized Referral Team does its due diligence, the insurance carrier gives the disclaimer that \"Although the procedure is authorized, this does not guarantee payment.\"    Ultimately the patient is responsible for payment.   Thank you for your understanding in this matter.  Paperwork Completion:  If you require FMLA or disability paperwork for your recovery, please make sure to either drop it off or have it faxed to our office at [phone]. Be sure the form has your name and date of birth on it.  The form will be faxed to our Forms Department and they will complete it for you.  There is a 25$ fee for all forms that need to be filled out.  Please be aware there is a 10-14 day turnaround time.  You will need to sign a release of information (SHANNON) form if your paperwork does not come with one.  You may call the Forms Department with any questions at [phone].  Their fax number is [phone].

## 2025-05-11 DIAGNOSIS — I10 BENIGN ESSENTIAL HYPERTENSION: ICD-10-CM

## 2025-05-14 RX ORDER — METOPROLOL SUCCINATE 100 MG/1
100 TABLET, EXTENDED RELEASE ORAL DAILY
Qty: 90 TABLET | Refills: 0 | Status: SHIPPED | OUTPATIENT
Start: 2025-05-14

## 2025-05-14 NOTE — TELEPHONE ENCOUNTER
Last Office Visit: 2/13/25  Last Refill: 2/13/25  Return to Clinic:  Protocol: failed  NOV: n/a   Requested Prescriptions     Pending Prescriptions Disp Refills    METOPROLOL SUCCINATE  MG Oral Tablet 24 Hr [Pharmacy Med Name: METOPROLOL SUCC  MG TAB] 90 tablet 0     Sig: TAKE 1 TABLET BY MOUTH EVERY DAY           Please approve if appropriate.     Thank you!

## 2025-05-22 ENCOUNTER — TELEPHONE (OUTPATIENT)
Dept: FAMILY MEDICINE CLINIC | Facility: CLINIC | Age: 50
End: 2025-05-22

## 2025-05-22 DIAGNOSIS — E78.00 HYPERCHOLESTEREMIA: ICD-10-CM

## 2025-05-22 RX ORDER — ROSUVASTATIN CALCIUM 20 MG/1
20 TABLET, COATED ORAL NIGHTLY
Qty: 90 TABLET | Refills: 0 | Status: SHIPPED | OUTPATIENT
Start: 2025-05-22

## 2025-05-22 NOTE — TELEPHONE ENCOUNTER
LOV: 2/13/25    NOV: None scheduled    Requesting refill: rosuvastatin 20mg    Last refill: 2/13/25 #90     Patient seen within the last 6 months. 3 months refill sent.

## 2025-05-22 NOTE — TELEPHONE ENCOUNTER
Patient called requesting refill of   rosuvastatin 20 MG Oral Tab     Please send to   [facility] [address] [place], [place] - [facility] [address] [phone], [phone]     Please advise.

## 2025-07-09 RX ORDER — FLUOXETINE HYDROCHLORIDE 40 MG/1
40 CAPSULE ORAL DAILY
Qty: 90 CAPSULE | Refills: 0 | Status: SHIPPED | OUTPATIENT
Start: 2025-07-09

## 2025-07-09 NOTE — TELEPHONE ENCOUNTER
Requested Prescriptions     Pending Prescriptions Disp Refills    FLUOXETINE HCL 40 MG Oral Cap [Pharmacy Med Name: FLUOXETINE HCL 40 MG CAPSULE] 90 capsule 0     Sig: TAKE 1 CAPSULE (40 MG TOTAL) BY MOUTH DAILY.     Last refill 2/13/25 #90  LOV 2/13/25  Future Appointments   None    RTC n/a    Psychiatric Non-Scheduled (Anti-Anxiety) [id number]/05/2025 07:05 AM   Protocol Details Depression Screening completed within the past 12 months    In person appointment or virtual visit in the past 6 mos or appointment in next 3 mos    Medication is active on med list

## 2025-08-22 DIAGNOSIS — E78.00 HYPERCHOLESTEREMIA: ICD-10-CM

## 2025-08-22 DIAGNOSIS — I10 BENIGN ESSENTIAL HYPERTENSION: ICD-10-CM

## 2025-08-22 RX ORDER — METOPROLOL SUCCINATE 100 MG/1
100 TABLET, EXTENDED RELEASE ORAL DAILY
Qty: 90 TABLET | Refills: 0 | Status: SHIPPED | OUTPATIENT
Start: 2025-08-22

## 2025-08-22 RX ORDER — ROSUVASTATIN CALCIUM 20 MG/1
20 TABLET, COATED ORAL NIGHTLY
Qty: 90 TABLET | Refills: 0 | Status: SHIPPED | OUTPATIENT
Start: 2025-08-22

## 2025-08-26 ENCOUNTER — TELEPHONE (OUTPATIENT)
Dept: FAMILY MEDICINE CLINIC | Facility: CLINIC | Age: 50
End: 2025-08-26

## 2025-08-26 DIAGNOSIS — E78.00 HYPERCHOLESTEREMIA: Primary | ICD-10-CM

## 2025-08-26 DIAGNOSIS — Z00.00 HEALTHCARE MAINTENANCE: ICD-10-CM

## 2025-08-28 ENCOUNTER — OFFICE VISIT (OUTPATIENT)
Dept: FAMILY MEDICINE CLINIC | Facility: CLINIC | Age: 50
End: 2025-08-28

## 2025-08-28 VITALS
BODY MASS INDEX: 38.09 KG/M2 | HEART RATE: 74 BPM | WEIGHT: 237 LBS | DIASTOLIC BLOOD PRESSURE: 86 MMHG | SYSTOLIC BLOOD PRESSURE: 138 MMHG | RESPIRATION RATE: 18 BRPM | TEMPERATURE: 97 F | HEIGHT: 66 IN

## 2025-08-28 DIAGNOSIS — R06.83 SNORING: ICD-10-CM

## 2025-08-28 DIAGNOSIS — Z23 NEED FOR VACCINATION: ICD-10-CM

## 2025-08-28 DIAGNOSIS — I10 BENIGN ESSENTIAL HYPERTENSION: ICD-10-CM

## 2025-08-28 DIAGNOSIS — Z23 ENCOUNTER FOR IMMUNIZATION: ICD-10-CM

## 2025-08-28 DIAGNOSIS — Z12.31 ENCOUNTER FOR SCREENING MAMMOGRAM FOR MALIGNANT NEOPLASM OF BREAST: ICD-10-CM

## 2025-08-28 DIAGNOSIS — F41.9 ANXIETY: ICD-10-CM

## 2025-08-28 PROCEDURE — G2211 COMPLEX E/M VISIT ADD ON: HCPCS | Performed by: FAMILY MEDICINE

## 2025-08-28 PROCEDURE — 99214 OFFICE O/P EST MOD 30 MIN: CPT | Performed by: FAMILY MEDICINE

## 2025-08-28 PROCEDURE — 90750 HZV VACC RECOMBINANT IM: CPT | Performed by: FAMILY MEDICINE

## 2025-08-28 PROCEDURE — 3079F DIAST BP 80-89 MM HG: CPT | Performed by: FAMILY MEDICINE

## 2025-08-28 PROCEDURE — 90471 IMMUNIZATION ADMIN: CPT | Performed by: FAMILY MEDICINE

## 2025-08-28 PROCEDURE — 3008F BODY MASS INDEX DOCD: CPT | Performed by: FAMILY MEDICINE

## 2025-08-28 PROCEDURE — 3075F SYST BP GE 130 - 139MM HG: CPT | Performed by: FAMILY MEDICINE

## 2025-08-28 RX ORDER — VALACYCLOVIR HYDROCHLORIDE 500 MG/1
500 TABLET, FILM COATED ORAL 2 TIMES DAILY
COMMUNITY
Start: 2025-07-28

## 2025-08-28 RX ORDER — HYDROCHLOROTHIAZIDE 25 MG/1
25 TABLET ORAL DAILY
Qty: 90 TABLET | Refills: 0 | Status: SHIPPED | OUTPATIENT
Start: 2025-08-28

## 2025-08-28 RX ORDER — TIRZEPATIDE 2.5 MG/.5ML
2.5 INJECTION, SOLUTION SUBCUTANEOUS WEEKLY
Qty: 2 ML | Refills: 0 | Status: SHIPPED | OUTPATIENT
Start: 2025-08-28 | End: 2025-09-19

## 2025-08-28 RX ORDER — FLUOXETINE HYDROCHLORIDE 40 MG/1
40 CAPSULE ORAL DAILY
Qty: 90 CAPSULE | Refills: 0 | Status: SHIPPED | OUTPATIENT
Start: 2025-08-28

## (undated) DIAGNOSIS — E78.00 HYPERCHOLESTEREMIA: ICD-10-CM

## (undated) DIAGNOSIS — I10 BENIGN ESSENTIAL HYPERTENSION: ICD-10-CM

## (undated) DEVICE — NASAL PACKING CS3600-10 NOVAPAK 10PK STD: Brand: NOVAPAK

## (undated) DEVICE — BLADE 1882040 5PK INFERIOR TURB 2MM

## (undated) DEVICE — ENTACT SEPTAL STAPLER 3 PACK: Brand: ENT SINUS

## (undated) DEVICE — STERILE POLYISOPRENE POWDER-FREE SURGICAL GLOVES: Brand: PROTEXIS

## (undated) DEVICE — SLEEVE KENDALL SCD EXPRESS MED

## (undated) DEVICE — SINUS CDS: Brand: MEDLINE INDUSTRIES, INC.

## (undated) NOTE — LETTER
Date & Time: 1/27/2025, 9:59 AM  Patient: mEily Wheeler  Encounter Provider(s):    Estela Farias APRN       To Whom It May Concern:    Emily Wheeler was seen and treated in our department on 1/27/2025. She should not return to work until her symptoms are improving .    If you have any questions or concerns, please do not hesitate to call.        ___Estela MCELROY_____  Physician/[facility] Signature